# Patient Record
Sex: FEMALE | Race: WHITE | NOT HISPANIC OR LATINO | Employment: FULL TIME | URBAN - METROPOLITAN AREA
[De-identification: names, ages, dates, MRNs, and addresses within clinical notes are randomized per-mention and may not be internally consistent; named-entity substitution may affect disease eponyms.]

---

## 2017-04-25 ENCOUNTER — OFFICE VISIT (OUTPATIENT)
Dept: INTERNAL MEDICINE | Facility: CLINIC | Age: 37
End: 2017-04-25

## 2017-04-25 ENCOUNTER — OFFICE VISIT (OUTPATIENT)
Dept: OBSTETRICS AND GYNECOLOGY | Facility: CLINIC | Age: 37
End: 2017-04-25

## 2017-04-25 ENCOUNTER — CONVERSION ENCOUNTER (OUTPATIENT)
Dept: INTERNAL MEDICINE | Facility: CLINIC | Age: 37
End: 2017-04-25

## 2017-04-25 VITALS
BODY MASS INDEX: 36.32 KG/M2 | HEIGHT: 63 IN | RESPIRATION RATE: 16 BRPM | WEIGHT: 205 LBS | SYSTOLIC BLOOD PRESSURE: 110 MMHG | HEART RATE: 76 BPM | DIASTOLIC BLOOD PRESSURE: 80 MMHG

## 2017-04-25 VITALS
HEIGHT: 63 IN | DIASTOLIC BLOOD PRESSURE: 82 MMHG | WEIGHT: 208 LBS | RESPIRATION RATE: 14 BRPM | SYSTOLIC BLOOD PRESSURE: 136 MMHG | BODY MASS INDEX: 36.86 KG/M2

## 2017-04-25 DIAGNOSIS — R63.5 WEIGHT GAIN: ICD-10-CM

## 2017-04-25 DIAGNOSIS — Z01.419 WOMEN'S ANNUAL ROUTINE GYNECOLOGICAL EXAMINATION: Primary | ICD-10-CM

## 2017-04-25 DIAGNOSIS — Z13.6 SCREENING FOR CARDIOVASCULAR CONDITION: Primary | ICD-10-CM

## 2017-04-25 DIAGNOSIS — N92.0 MENORRHAGIA WITH REGULAR CYCLE: ICD-10-CM

## 2017-04-25 PROCEDURE — 99395 PREV VISIT EST AGE 18-39: CPT | Performed by: INTERNAL MEDICINE

## 2017-04-25 PROCEDURE — 99395 PREV VISIT EST AGE 18-39: CPT | Performed by: OBSTETRICS & GYNECOLOGY

## 2017-04-25 RX ORDER — BUSPIRONE HYDROCHLORIDE 5 MG/1
5 TABLET ORAL 3 TIMES DAILY
COMMUNITY
End: 2020-03-09 | Stop reason: SDDI

## 2017-04-25 NOTE — PROGRESS NOTES
"Subjective   Chief Complaint   Patient presents with   • Gynecologic Exam     in last year 2 occ of very heavy period with pelvic pain     Denise Lambert is a 36 y.o. year old No obstetric history on file. presenting to be seen for her annual exam.    Current birth control method: tubal ligation was done in October 2015 by myself.  She said that her  mentioned that I said there was a spot of \"endometriosis \".  A total of the defined the operative note from the surgery center as I don't have her paper chart.  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  She is also had increasing cramping with her cycles.  She has about 2 heavy days of bleeding.  Has no pain during intercourse.  Options were discussed including a Mirena IUD which she is used in the past before tubal ligation.  Also discussed endometrial ablation and had given her some information regarding ablation procedure.    Patient's last menstrual period was 04/20/2017.    She is sexually active.   Condoms are not typically used.      Past 6 month menstrual history:    Cycle Frequency: regular, predictable and consistent every 28 - 32 days   Menstrual cycle character: flow has been heavy some months and light other months   Cycle Duration: 5 - 7   Number of heavy days of flows: 2   Intermenstrual bleeding present: {no   Post-coital bleeding present: no     She exercises regularly: yes.  She has lost about 20 pounds in last 4 months.  Calcium intake: yes.  She performs self breast exam:no.  She has concerns about domestic violence: no.    The following portions of the patient's history were reviewed and updated as appropriate:problem list, current medications, allergies, past family history, past medical history, past social history and past surgical history.    Review of Systems      Objective   /82  Resp 14  Ht 63\" (160 cm)  Wt 208 lb (94.3 kg)  LMP 04/20/2017  BMI 36.85 kg/m2    General:  well " developed; well nourished  no acute distress  obese - Body mass index is 36.85 kg/(m^2).   Skin:  No suspicious lesions seen   Thyroid: not examined   Breasts:  Examined in supine position  Symmetric without masses or skin dimpling  Nipples normal without inversion, lesions or discharge  There are no palpable axillary nodes   Abdomen: soft, non-tender; no masses  no umbilical or inginual hernias are present  no hepato-splenomegaly   Pelvis: Clinical staff was present for exam  External genitalia:  normal appearance of the external genitalia including Bartholin's and [place]'s glands.  :  urethral meatus normal; urethral hypermobility is absent.  Vaginal:  normal pink mucosa without prolapse or lesions. blood present -  small amount;  Cervix:  normal appearance. blood is seen coming from external cervical os; Pap smear done  Uterus:  normal size, shape and consistency. anteverted;  Adnexa:  normal bimanual exam of the adnexa.  Rectal:  digital rectal exam not performed; anus visually normal appearing.     Lab Review   No data reviewed    Imaging  No data reviewed       Assessment   1. Menorrhagia and dysmenorrhea possibly associated with endometriosis.  Options discussed including IUD and ablation.  2. She is status post 2 vaginal deliveries 9 lbs. 8 oz. 9 lbs. 1 oz. 02/01/07; status post tubal ligation October 2015     Plan   1. We will follow up the results for Pap co-test, she hasn't in fresh gray ablation and will consider her options for heavy menses including a Mirena IUD.  2. I will review [facility]    Medications Rx this encounter:  No orders of the defined types were placed in this encounter.         This note was electronically signed.    Dexter Mason MD  4/25/2017  Addendum 4/28/17  Her operative note from October 2015 at [facility] [place] revealed that there was probable minimal endometriosis in the posterior cul-de-sac.  She underwent bilateral  tubal banding at that time along with removal of a Mirena IUD.

## 2017-04-25 NOTE — PROGRESS NOTES
Subjective   Denise Lambert is a 36 y.o. female.   Pt is here for physical today.             History of Present Illness   Pt is here for physical today.   Colonoscopy- Not due til 51 yo   Pap- Will do with Dr. Mason today   Breast exam-will do with Dr. Mason today   Mammogram- Not due til 41 yo   DEXA scan- Not due til 51 yo   Tetanus booster-Done 1/1/2012  PNA vaccine- Will be due in 50/60's   Shingles vaccine- Had chicken pox, will be due in your 50's.   Flu vaccine- Done 10/1/2016     All chronic issues are doing well with no acute complaints.   Pt started weight watchers in January and states she is doing well with this as far as losing weight.     The following portions of the patient's history were reviewed and updated as appropriate: allergies, current medications, past family history, past medical history, past social history, past surgical history and problem list.             Review of Systems   Constitutional: Negative.    HENT: Negative.    Eyes: Negative.    Respiratory: Negative.    Cardiovascular: Negative.    Gastrointestinal: Negative.    Endocrine: Negative.    Genitourinary: Negative.    Musculoskeletal: Negative.    Skin: Negative.    Allergic/Immunologic: Negative.    Neurological: Negative.    Hematological: Negative.    Psychiatric/Behavioral: Negative.                 Objective   Physical Exam   Constitutional: She is oriented to person, place, and time. She appears well-developed and well-nourished.   HENT:   Head: Normocephalic and atraumatic.   Right Ear: External ear normal.   Left Ear: External ear normal.   Nose: Nose normal.   Mouth/Throat: Oropharynx is clear and moist.   Eyes: Conjunctivae and EOM are normal. Pupils are equal, round, and reactive to light.   Neck: Normal range of motion. Neck supple. No tracheal deviation present. No thyromegaly present.   Cardiovascular: Normal rate, regular rhythm, normal heart sounds and intact distal pulses.    Pulmonary/Chest: Effort normal and  breath sounds normal.   Abdominal: Soft. Bowel sounds are normal. She exhibits no distension. There is no tenderness.   Neurological: She is alert and oriented to person, place, and time. She has normal reflexes.   Skin: Skin is warm and dry.   Psychiatric: She has a normal mood and affect.   Nursing note and vitals reviewed.               Assessment/Plan      Screening for cardiovascular condition  -     Cancel: Comprehensive Metabolic Panel  -     Cancel: Lipid Panel  -     Cancel: CBC (No Diff)  -     Comprehensive Metabolic Panel; Future  -     Lipid Panel; Future  -     CBC (No Diff); Future    Weight gain  -     Cancel: TSH  -     TSH; Future        1.) Check CBC,CMP,TSH  and lipid panel   2.) Follow up in 1 year or sooner if needed   3.) 10 minutes spent counseling on preventative screens and vaccines.

## 2017-04-26 LAB
ALBUMIN SERPL-MCNC: 4.4 G/DL (ref 3.5–5.5)
ALBUMIN/GLOB SERPL: 1.7 {RATIO} (ref 1.2–2.2)
ALP SERPL-CCNC: 59 IU/L (ref 39–117)
ALT SERPL-CCNC: 11 IU/L (ref 0–32)
AMBIG ABBREV BMP8 DEFAULT: NORMAL
AMBIG ABBREV LP DEFAULT: NORMAL
AST SERPL-CCNC: 14 IU/L (ref 0–40)
BASOPHILS # BLD AUTO: 0 X10E3/UL (ref 0–0.2)
BASOPHILS NFR BLD AUTO: 0 %
BILIRUB SERPL-MCNC: 0.4 MG/DL (ref 0–1.2)
BUN SERPL-MCNC: 12 MG/DL (ref 6–20)
BUN/CREAT SERPL: 20 (ref 9–23)
CALCIUM SERPL-MCNC: 9.1 MG/DL (ref 8.7–10.2)
CHLORIDE SERPL-SCNC: 101 MMOL/L (ref 96–106)
CHOLEST SERPL-MCNC: 195 MG/DL (ref 100–199)
CO2 SERPL-SCNC: 22 MMOL/L (ref 18–29)
CREAT SERPL-MCNC: 0.61 MG/DL (ref 0.57–1)
EOSINOPHIL # BLD AUTO: 0.1 X10E3/UL (ref 0–0.4)
EOSINOPHIL NFR BLD AUTO: 2 %
ERYTHROCYTE [DISTWIDTH] IN BLOOD BY AUTOMATED COUNT: 13.6 % (ref 12.3–15.4)
GLOBULIN SER CALC-MCNC: 2.6 G/DL (ref 1.5–4.5)
GLUCOSE SERPL-MCNC: 78 MG/DL (ref 65–99)
HCT VFR BLD AUTO: 37.9 % (ref 34–46.6)
HDLC SERPL-MCNC: 41 MG/DL
HGB BLD-MCNC: 13.2 G/DL (ref 11.1–15.9)
IMM GRANULOCYTES # BLD: 0 X10E3/UL (ref 0–0.1)
IMM GRANULOCYTES NFR BLD: 0 %
LDLC SERPL CALC-MCNC: 128 MG/DL (ref 0–99)
LYMPHOCYTES # BLD AUTO: 2.2 X10E3/UL (ref 0.7–3.1)
LYMPHOCYTES NFR BLD AUTO: 37 %
MCH RBC QN AUTO: 28.4 PG (ref 26.6–33)
MCHC RBC AUTO-ENTMCNC: 34.8 G/DL (ref 31.5–35.7)
MCV RBC AUTO: 82 FL (ref 79–97)
MONOCYTES # BLD AUTO: 0.3 X10E3/UL (ref 0.1–0.9)
MONOCYTES NFR BLD AUTO: 5 %
NEUTROPHILS # BLD AUTO: 3.3 X10E3/UL (ref 1.4–7)
NEUTROPHILS NFR BLD AUTO: 56 %
PLATELET # BLD AUTO: 274 X10E3/UL (ref 150–379)
POTASSIUM SERPL-SCNC: 4.1 MMOL/L (ref 3.5–5.2)
PROT SERPL-MCNC: 7 G/DL (ref 6–8.5)
RBC # BLD AUTO: 4.65 X10E6/UL (ref 3.77–5.28)
SODIUM SERPL-SCNC: 142 MMOL/L (ref 134–144)
TRIGL SERPL-MCNC: 128 MG/DL (ref 0–149)
TSH SERPL DL<=0.005 MIU/L-ACNC: 2.64 UIU/ML (ref 0.45–4.5)
VLDLC SERPL CALC-MCNC: 26 MG/DL (ref 5–40)
WBC # BLD AUTO: 5.8 X10E3/UL (ref 3.4–10.8)

## 2017-04-28 PROBLEM — Z98.51 S/P TUBAL LIGATION: Status: ACTIVE | Noted: 2017-04-28

## 2017-04-28 PROBLEM — N80.9 ENDOMETRIOSIS: Status: ACTIVE | Noted: 2017-04-28

## 2017-04-28 PROBLEM — N75.0 CYST OF BARTHOLIN GLAND OR DUCT: Status: ACTIVE | Noted: 2017-04-28

## 2017-05-01 ENCOUNTER — TELEPHONE (OUTPATIENT)
Dept: OBSTETRICS AND GYNECOLOGY | Facility: CLINIC | Age: 37
End: 2017-05-01

## 2017-05-06 DIAGNOSIS — Z87.42 HISTORY OF PCOS: Primary | ICD-10-CM

## 2017-05-06 RX ORDER — MISOPROSTOL 200 UG/1
200 TABLET ORAL 2 TIMES DAILY
Qty: 2 TABLET | Refills: 0 | Status: SHIPPED | OUTPATIENT
Start: 2017-05-06 | End: 2018-02-27

## 2018-02-27 ENCOUNTER — HOSPITAL ENCOUNTER (OUTPATIENT)
Dept: GENERAL RADIOLOGY | Facility: HOSPITAL | Age: 38
Discharge: HOME | End: 2018-02-27
Admitting: NURSE PRACTITIONER

## 2018-02-27 ENCOUNTER — OFFICE VISIT (OUTPATIENT)
Dept: INTERNAL MEDICINE | Facility: CLINIC | Age: 38
End: 2018-02-27

## 2018-02-27 VITALS
SYSTOLIC BLOOD PRESSURE: 122 MMHG | RESPIRATION RATE: 21 BRPM | HEART RATE: 70 BPM | HEIGHT: 63 IN | BODY MASS INDEX: 37.74 KG/M2 | TEMPERATURE: 97.6 F | WEIGHT: 213 LBS | DIASTOLIC BLOOD PRESSURE: 76 MMHG

## 2018-02-27 DIAGNOSIS — M54.50 ACUTE BILATERAL LOW BACK PAIN WITHOUT SCIATICA: Primary | ICD-10-CM

## 2018-02-27 DIAGNOSIS — M54.50 ACUTE BILATERAL LOW BACK PAIN WITHOUT SCIATICA: ICD-10-CM

## 2018-02-27 PROCEDURE — 99214 OFFICE O/P EST MOD 30 MIN: CPT | Performed by: NURSE PRACTITIONER

## 2018-02-27 PROCEDURE — 72100 X-RAY EXAM L-S SPINE 2/3 VWS: CPT

## 2018-02-27 NOTE — PROGRESS NOTES
Subjective   Denise Lambert is a 37 y.o. female.     History of Present Illness complains of back pain for years and back stiffness has been worsening in the mornings for past few weeks. Pain worsened with obesity. Pain improved with pain patches samples from work and requests more of these, movement relieves pain. Patient reports family history of back issues and expects to have back problems. Requests physical therapy.    The following portions of the patient's history were reviewed and updated as appropriate: allergies, current medications, past family history, past medical history, past social history, past surgical history and problem list.    Review of Systems   Constitutional: Negative for chills, fatigue and fever.   HENT: Negative for congestion, dental problem, mouth sores, nosebleeds, postnasal drip, rhinorrhea, sinus pain, sinus pressure, sneezing and sore throat.         Denies snoring.   Eyes: Negative for pain, discharge, redness and itching.   Respiratory: Negative for cough, shortness of breath and wheezing.    Cardiovascular: Negative for chest pain, palpitations and leg swelling.        No LEMA, orthopnea, PND, or claudication.   Gastrointestinal: Negative for abdominal distention, abdominal pain, blood in stool, diarrhea, nausea and vomiting.        Recent colonoscopy that was normal   Endocrine: Negative for cold intolerance, heat intolerance, polydipsia and polyuria.   Genitourinary: Negative for difficulty urinating, dysuria, frequency, hematuria and urgency.   Musculoskeletal: Positive for back pain. Negative for arthralgias, gait problem, joint swelling and myalgias.        4/0-10   Skin: Negative for color change, rash and wound.        Left elbow itching   Neurological: Negative for dizziness, syncope, weakness, light-headedness, numbness and headaches.   Hematological: Negative for adenopathy. Does not bruise/bleed easily.       Objective   Physical Exam   Constitutional: She is oriented  to person, place, and time. She appears well-developed and well-nourished. She is cooperative. She is easily aroused.  Non-toxic appearance. She does not have a sickly appearance. She does not appear ill. No distress.   HENT:   Head: Normocephalic and atraumatic. Head is without abrasion. Hair is normal.   Right Ear: Hearing, tympanic membrane, external ear and ear canal normal. No foreign bodies. Tympanic membrane is not perforated and not erythematous.   Left Ear: Hearing, tympanic membrane, external ear and ear canal normal. No foreign bodies. Tympanic membrane is not perforated and not erythematous.   Nose: Nose normal. No mucosal edema, rhinorrhea or septal deviation. No epistaxis.  No foreign bodies.   Mouth/Throat: Oropharynx is clear and moist. No oral lesions. Normal dentition.   Eyes: Conjunctivae and lids are normal. Pupils are equal, round, and reactive to light. Right eye exhibits no discharge. Left eye exhibits no discharge. Right conjunctiva is not injected. Left conjunctiva is not injected. No scleral icterus.   Neck: Normal range of motion and full passive range of motion without pain. Neck supple. No edema and normal range of motion present. No thyroid mass and no thyromegaly present.   Cardiovascular: Normal rate, regular rhythm and normal heart sounds.  Exam reveals no gallop and no friction rub.    No murmur heard.  Pulmonary/Chest: Effort normal and breath sounds normal. No accessory muscle usage. She has no rhonchi. She has no rales. She exhibits no tenderness.   Abdominal: Soft. Bowel sounds are normal. She exhibits no distension. There is no hepatosplenomegaly or hepatomegaly. There is no tenderness. There is no CVA tenderness.   Musculoskeletal: Normal range of motion. She exhibits no edema or deformity.        Lumbar back: She exhibits tenderness, pain and spasm. She exhibits normal range of motion, no bony tenderness, no swelling, no edema and no deformity.   Negative SLR    Lymphadenopathy:     She has no cervical adenopathy.   Neurological: She is alert, oriented to person, place, and time and easily aroused. She has normal reflexes. No cranial nerve deficit. Coordination normal.   Muscle strength 5/5 and equal throughout.   Skin: Skin is warm, dry and intact. No abrasion and no rash noted. She is not diaphoretic. No cyanosis or erythema. Nails show no clubbing.   Psychiatric: She has a normal mood and affect. Her speech is normal and behavior is normal.   Nursing note and vitals reviewed.      Assessment/Plan   Denise was seen today for back pain.    Diagnoses and all orders for this visit:    Acute bilateral low back pain without sciatica  -     XR Spine Lumbar 2 or 3 View; Future  -     Ambulatory Referral to Physical Therapy

## 2018-03-01 ENCOUNTER — PRIOR AUTHORIZATION (OUTPATIENT)
Dept: INTERNAL MEDICINE | Facility: CLINIC | Age: 38
End: 2018-03-01

## 2018-03-14 ENCOUNTER — APPOINTMENT (OUTPATIENT)
Dept: PHYSICAL THERAPY | Facility: HOSPITAL | Age: 38
End: 2018-03-14

## 2018-05-03 NOTE — PROGRESS NOTES
Chief Complaint   Patient presents with   • Annual Exam       Subjective   Annual physical    History of Present Illness   Here for annual physical. Has had weight loss from 213 to 202 with effort.  Denise Lambert is a 37 y.o. female.     Are you currently seeing any other doctors or specialists?  Weight loss-[facility]  Are you currently taking any OTC medications or herbal medications?   No  Sleep: 7 hours/night  Diet: balanced  Exercise: Gym 5 days per night with [employer]    Most recent colonoscopy: N/A  Most recent mammogram:N/A  Most recent pap smear: Current  First day of last menses: 4/19/2018    Regular dental visits: Current  Regular eye exams: Current    The following portions of the patient's history were reviewed and updated as appropriate: allergies, current medications, past family history, past medical history, past social history, past surgical history and problem list.    Allergies   Allergen Reactions   • Doxycycline    • Spironolactone GI Intolerance     Social History   Substance Use Topics   • Smoking status: Never Smoker   • Smokeless tobacco: Never   • Alcohol use Yes      Comment: occ     Past Surgical History:   Procedure Laterality Date   • CARPAL TUNNEL RELEASE     • TUBAL ABDOMINAL LIGATION       Family History   Problem Relation Age of Onset   • Diabetes Mother    • Hyperlipidemia Mother    • Osteoporosis Mother    • Mental illness Mother    • Depression Mother    • Obesity Mother    • Hyperlipidemia Father    • Hypertension Father    • ADD / ADHD Brother    • Hyperlipidemia Brother    • ADD / ADHD Daughter    • Diabetes Maternal Aunt    • Diabetes Maternal Uncle    • Diabetes Maternal Grandmother    • Diabetes Maternal Grandfather          Current Outpatient Prescriptions:   •  busPIRone (BUSPAR) 5 MG tablet, Take 5 mg by mouth 3 (Three) Times a Day., Disp: , Rfl:   •  diclofenac (FLECTOR) 1.3 % patch patch, Apply 1 patch topically 2 (Two) Times a Day., Disp: 180 patch, Rfl: 0  •   phentermine (ADIPEX-P) 37.5 MG tablet, Take 37.5 mg by mouth Every Morning Before Breakfast. Pt takes 1/2 tablet BID, Disp: , Rfl:     Patient Active Problem List   Diagnosis   • S/P tubal ligation OCt 2015   • surgery  Bartholin gland or duct 2003   • Endometriosis minimal 2015       Review of Systems   Constitutional: Negative for chills, fatigue and fever.   HENT: Negative for congestion, dental problem, mouth sores, nosebleeds, postnasal drip, rhinorrhea, sinus pressure, sneezing and sore throat.         Denies snoring.   Eyes: Negative for pain, discharge, redness and itching.   Respiratory: Negative for cough, shortness of breath and wheezing.    Cardiovascular: Negative for chest pain, palpitations and leg swelling.        No LEMA, orthopnea, PND, or claudication.   Gastrointestinal: Negative for abdominal distention, abdominal pain, blood in stool, diarrhea, nausea and vomiting.   Endocrine: Negative for cold intolerance, heat intolerance, polydipsia and polyuria.   Genitourinary: Negative for difficulty urinating, dysuria, frequency, hematuria and urgency.        PCOS   Musculoskeletal: Positive for back pain. Negative for arthralgias, gait problem, joint swelling and myalgias.   Skin: Negative for color change and rash.        No wound.   Allergic/Immunologic: Negative.    Neurological: Negative for dizziness, syncope, weakness, light-headedness and numbness.   Hematological: Negative for adenopathy. Does not bruise/bleed easily.   Psychiatric/Behavioral:        Anxiety       Objective   Vitals:    05/04/18 1625   BP: 116/68   Pulse: 68   Resp: 16   Temp: 97.1 °F (36.2 °C)   SpO2: 99%     Physical Exam   Constitutional: She is oriented to person, place, and time. She appears well-developed and well-nourished. She is cooperative. She is easily aroused.  Non-toxic appearance. She does not have a sickly appearance. She does not appear ill. No distress.   HENT:   Head: Normocephalic and atraumatic. Head is  without abrasion. Hair is normal.   Right Ear: Hearing, tympanic membrane, external ear and ear canal normal. No foreign bodies. Tympanic membrane is not perforated and not erythematous.   Left Ear: Hearing, tympanic membrane, external ear and ear canal normal. No foreign bodies. Tympanic membrane is not perforated and not erythematous.   Nose: Nose normal. No mucosal edema, rhinorrhea or septal deviation. No epistaxis.  No foreign bodies.   Mouth/Throat: Oropharynx is clear and moist. No oral lesions. Normal dentition.   Eyes: Conjunctivae and lids are normal. Pupils are equal, round, and reactive to light. Right eye exhibits no discharge. Left eye exhibits no discharge. Right conjunctiva is not injected. Left conjunctiva is not injected. No scleral icterus.   Neck: Normal range of motion and full passive range of motion without pain. Neck supple. No edema and normal range of motion present. No thyroid mass and no thyromegaly present.   Cardiovascular: Normal rate, regular rhythm and normal heart sounds.  Exam reveals no gallop and no friction rub.    No murmur heard.  Pulmonary/Chest: Effort normal and breath sounds normal. No accessory muscle usage. She has no rhonchi. She has no rales. She exhibits no tenderness.   Abdominal: Soft. Bowel sounds are normal. She exhibits no distension. There is no hepatosplenomegaly or hepatomegaly. There is no tenderness. There is no CVA tenderness.   Musculoskeletal: Normal range of motion. She exhibits no edema or deformity.        Lumbar back: She exhibits tenderness.   Lymphadenopathy:     She has no cervical adenopathy.   Neurological: She is alert, oriented to person, place, and time and easily aroused. She has normal reflexes. No cranial nerve deficit. Coordination normal.   Muscle strength 5/5 and equal throughout.   Skin: Skin is warm, dry and intact. No abrasion and no rash noted. She is not diaphoretic. No cyanosis or erythema. Nails show no clubbing.   Psychiatric:  She has a normal mood and affect. Her speech is normal and behavior is normal.   Nursing note and vitals reviewed.        Results for orders placed or performed in visit on 04/25/17   Mohini Molinarev LP Default   Result Value Ref Range    Mohini Molinarev LP Default Comment    Mohini Pearson BMP8 Default   Result Value Ref Range    BMP Comment    TSH   Result Value Ref Range    TSH 2.640 0.450 - 4.500 uIU/mL   Lipid Panel   Result Value Ref Range    Total Cholesterol 195 100 - 199 mg/dL    Triglycerides 128 0 - 149 mg/dL    HDL Cholesterol 41 >39 mg/dL    VLDL Cholesterol 26 5 - 40 mg/dL    LDL Cholesterol  128 (H) 0 - 99 mg/dL   Comprehensive Metabolic Panel   Result Value Ref Range    Glucose 78 65 - 99 mg/dL    BUN 12 6 - 20 mg/dL    Creatinine 0.61 0.57 - 1.00 mg/dL    eGFR Non African Am 119 >59 mL/min/1.73    eGFR African Am 137 >59 mL/min/1.73    BUN/Creatinine Ratio 20 9 - 23    Sodium 142 134 - 144 mmol/L    Potassium 4.1 3.5 - 5.2 mmol/L    Chloride 101 96 - 106 mmol/L    Total CO2 22 18 - 29 mmol/L    Calcium 9.1 8.7 - 10.2 mg/dL    Total Protein 7.0 6.0 - 8.5 g/dL    Albumin 4.4 3.5 - 5.5 g/dL    Globulin 2.6 1.5 - 4.5 g/dL    A/G Ratio 1.7 1.2 - 2.2    Total Bilirubin 0.4 0.0 - 1.2 mg/dL    Alkaline Phosphatase 59 39 - 117 IU/L    AST (SGOT) 14 0 - 40 IU/L    ALT (SGPT) 11 0 - 32 IU/L   CBC & Differential   Result Value Ref Range    WBC 5.8 3.4 - 10.8 x10E3/uL    RBC 4.65 3.77 - 5.28 x10E6/uL    Hemoglobin 13.2 11.1 - 15.9 g/dL    Hematocrit 37.9 34.0 - 46.6 %    MCV 82 79 - 97 fL    MCH 28.4 26.6 - 33.0 pg    MCHC 34.8 31.5 - 35.7 g/dL    RDW 13.6 12.3 - 15.4 %    Platelets 274 150 - 379 x10E3/uL    Neutrophil Rel % 56 %    Lymphocyte Rel % 37 %    Monocyte Rel % 5 %    Eosinophil Rel % 2 %    Basophil Rel % 0 %    Neutrophils Absolute 3.3 1.4 - 7.0 x10E3/uL    Lymphocytes Absolute 2.2 0.7 - 3.1 x10E3/uL    Monocytes Absolute 0.3 0.1 - 0.9 x10E3/uL    Eosinophils Absolute 0.1 0.0 - 0.4 x10E3/uL    Basophils  Absolute 0.0 0.0 - 0.2 x10E3/uL    Immature Granulocyte Rel % 0 %    Immature Grans Absolute 0.0 0.0 - 0.1 x10E3/uL       Assessment/Plan   Denise was seen today for annual exam.    Diagnoses and all orders for this visit:    Encounter for preventive health examination    Screening for lipoid disorders  -     Comprehensive Metabolic Panel  -     Lipid Panel  -     CBC & Differential  Encounter for screening for other disorder  -     CBC & Differential  Screening for endocrine disorder  -     T4, Free  -     TSH                 Patient education discussed during this visit:  - avoidance of texting while driving and the need for wearing seatbelt  - use of sunscreen  - healthy sleep habits and appropriate amount of sleep  - H2O consumption, well-balanced diet  - exercise routine which includes at least 150 minutes of cardio per week + muscle strengthening exercises  - immunizations including annual flu vaccination      Return in about 1 year (around 5/4/2019), or if symptoms worsen or fail to improve.

## 2018-05-04 ENCOUNTER — OFFICE VISIT (OUTPATIENT)
Dept: INTERNAL MEDICINE | Facility: CLINIC | Age: 38
End: 2018-05-04

## 2018-05-04 VITALS
HEART RATE: 68 BPM | SYSTOLIC BLOOD PRESSURE: 116 MMHG | WEIGHT: 202 LBS | BODY MASS INDEX: 35.79 KG/M2 | HEIGHT: 63 IN | DIASTOLIC BLOOD PRESSURE: 68 MMHG | TEMPERATURE: 97.1 F | OXYGEN SATURATION: 99 % | RESPIRATION RATE: 16 BRPM

## 2018-05-04 DIAGNOSIS — Z00.00 ENCOUNTER FOR PREVENTIVE HEALTH EXAMINATION: Primary | ICD-10-CM

## 2018-05-04 DIAGNOSIS — Z13.220 SCREENING FOR LIPOID DISORDERS: ICD-10-CM

## 2018-05-04 DIAGNOSIS — Z13.29 SCREENING FOR ENDOCRINE DISORDER: ICD-10-CM

## 2018-05-04 DIAGNOSIS — Z13.89 ENCOUNTER FOR SCREENING FOR OTHER DISORDER: ICD-10-CM

## 2018-05-04 LAB
ALBUMIN SERPL-MCNC: 4.6 G/DL (ref 3.2–4.8)
ALBUMIN/GLOB SERPL: 1.6 G/DL (ref 1.5–2.5)
ALP SERPL-CCNC: 50 U/L (ref 25–100)
ALT SERPL W P-5'-P-CCNC: 16 U/L (ref 7–40)
ANION GAP SERPL CALCULATED.3IONS-SCNC: 8 MMOL/L (ref 3–11)
ARTICHOKE IGE QN: 150 MG/DL (ref 0–130)
AST SERPL-CCNC: 21 U/L (ref 0–33)
BASOPHILS # BLD AUTO: 0.01 10*3/MM3 (ref 0–0.2)
BASOPHILS NFR BLD AUTO: 0.2 % (ref 0–1)
BILIRUB SERPL-MCNC: 0.8 MG/DL (ref 0.3–1.2)
BUN BLD-MCNC: 10 MG/DL (ref 9–23)
BUN/CREAT SERPL: 16.7 (ref 7–25)
CALCIUM SPEC-SCNC: 9.3 MG/DL (ref 8.7–10.4)
CHLORIDE SERPL-SCNC: 106 MMOL/L (ref 99–109)
CHOLEST SERPL-MCNC: 210 MG/DL (ref 0–200)
CO2 SERPL-SCNC: 24 MMOL/L (ref 20–31)
CREAT BLD-MCNC: 0.6 MG/DL (ref 0.6–1.3)
DEPRECATED RDW RBC AUTO: 37.7 FL (ref 37–54)
EOSINOPHIL # BLD AUTO: 0.12 10*3/MM3 (ref 0–0.3)
EOSINOPHIL NFR BLD AUTO: 1.9 % (ref 0–3)
ERYTHROCYTE [DISTWIDTH] IN BLOOD BY AUTOMATED COUNT: 12.6 % (ref 11.3–14.5)
GFR SERPL CREATININE-BSD FRML MDRD: 114 ML/MIN/1.73
GLOBULIN UR ELPH-MCNC: 2.8 GM/DL
GLUCOSE BLD-MCNC: 85 MG/DL (ref 70–100)
HCT VFR BLD AUTO: 39.4 % (ref 34.5–44)
HDLC SERPL-MCNC: 47 MG/DL (ref 40–60)
HGB BLD-MCNC: 13.5 G/DL (ref 11.5–15.5)
IMM GRANULOCYTES # BLD: 0 10*3/MM3 (ref 0–0.03)
IMM GRANULOCYTES NFR BLD: 0 % (ref 0–0.6)
LYMPHOCYTES # BLD AUTO: 2.14 10*3/MM3 (ref 0.6–4.8)
LYMPHOCYTES NFR BLD AUTO: 33.1 % (ref 24–44)
MCH RBC QN AUTO: 28.4 PG (ref 27–31)
MCHC RBC AUTO-ENTMCNC: 34.3 G/DL (ref 32–36)
MCV RBC AUTO: 82.8 FL (ref 80–99)
MONOCYTES # BLD AUTO: 0.34 10*3/MM3 (ref 0–1)
MONOCYTES NFR BLD AUTO: 5.3 % (ref 0–12)
NEUTROPHILS # BLD AUTO: 3.85 10*3/MM3 (ref 1.5–8.3)
NEUTROPHILS NFR BLD AUTO: 59.5 % (ref 41–71)
PLATELET # BLD AUTO: 225 10*3/MM3 (ref 150–450)
PMV BLD AUTO: 9.7 FL (ref 6–12)
POTASSIUM BLD-SCNC: 4.1 MMOL/L (ref 3.5–5.5)
PROT SERPL-MCNC: 7.4 G/DL (ref 5.7–8.2)
RBC # BLD AUTO: 4.76 10*6/MM3 (ref 3.89–5.14)
SODIUM BLD-SCNC: 138 MMOL/L (ref 132–146)
T4 FREE SERPL-MCNC: 1.16 NG/DL (ref 0.89–1.76)
TRIGL SERPL-MCNC: 133 MG/DL (ref 0–150)
TSH SERPL DL<=0.05 MIU/L-ACNC: 2.33 MIU/ML (ref 0.35–5.35)
WBC NRBC COR # BLD: 6.46 10*3/MM3 (ref 3.5–10.8)

## 2018-05-04 PROCEDURE — 84443 ASSAY THYROID STIM HORMONE: CPT | Performed by: NURSE PRACTITIONER

## 2018-05-04 PROCEDURE — 80053 COMPREHEN METABOLIC PANEL: CPT | Performed by: NURSE PRACTITIONER

## 2018-05-04 PROCEDURE — 84439 ASSAY OF FREE THYROXINE: CPT | Performed by: NURSE PRACTITIONER

## 2018-05-04 PROCEDURE — 80061 LIPID PANEL: CPT | Performed by: NURSE PRACTITIONER

## 2018-05-04 PROCEDURE — 99395 PREV VISIT EST AGE 18-39: CPT | Performed by: NURSE PRACTITIONER

## 2018-05-04 PROCEDURE — 36415 COLL VENOUS BLD VENIPUNCTURE: CPT | Performed by: NURSE PRACTITIONER

## 2018-05-04 PROCEDURE — 85025 COMPLETE CBC W/AUTO DIFF WBC: CPT | Performed by: NURSE PRACTITIONER

## 2018-05-04 RX ORDER — PHENTERMINE HYDROCHLORIDE 37.5 MG/1
37.5 TABLET ORAL
COMMUNITY
End: 2019-05-17

## 2018-05-07 ENCOUNTER — TELEPHONE (OUTPATIENT)
Dept: INTERNAL MEDICINE | Facility: CLINIC | Age: 38
End: 2018-05-07

## 2018-05-07 NOTE — TELEPHONE ENCOUNTER
Pt aware to return in 6 months. She will call back to make that appointment. She thanked our office and we ended the call.

## 2019-05-17 ENCOUNTER — OFFICE VISIT (OUTPATIENT)
Dept: INTERNAL MEDICINE | Facility: CLINIC | Age: 39
End: 2019-05-17

## 2019-05-17 VITALS
TEMPERATURE: 98.3 F | BODY MASS INDEX: 31.79 KG/M2 | WEIGHT: 186.2 LBS | DIASTOLIC BLOOD PRESSURE: 70 MMHG | RESPIRATION RATE: 18 BRPM | HEART RATE: 81 BPM | HEIGHT: 64 IN | SYSTOLIC BLOOD PRESSURE: 118 MMHG

## 2019-05-17 DIAGNOSIS — G89.29 CHRONIC BILATERAL LOW BACK PAIN WITHOUT SCIATICA: ICD-10-CM

## 2019-05-17 DIAGNOSIS — M54.50 CHRONIC BILATERAL LOW BACK PAIN WITHOUT SCIATICA: ICD-10-CM

## 2019-05-17 DIAGNOSIS — Z13.89 ENCOUNTER FOR SCREENING FOR OTHER DISORDER: ICD-10-CM

## 2019-05-17 DIAGNOSIS — Z00.00 ENCOUNTER FOR PREVENTIVE HEALTH EXAMINATION: Primary | ICD-10-CM

## 2019-05-17 PROCEDURE — 99395 PREV VISIT EST AGE 18-39: CPT | Performed by: NURSE PRACTITIONER

## 2019-05-17 RX ORDER — METFORMIN HYDROCHLORIDE 750 MG/1
750 TABLET, EXTENDED RELEASE ORAL 2 TIMES DAILY
COMMUNITY
Start: 2019-04-25 | End: 2020-07-27

## 2019-05-17 NOTE — PROGRESS NOTES
Chief Complaint   Patient presents with   • Annual Exam       Subjective   Physical.  Back pain.    History of Present Illness     Denise Lambert is a 38 y.o. female.   Physical.    She is trying diet with supplements that is basically low CHO at work and has lost weight since last physical. She reports she has lost 54 pounds total.    She is on metformin for PCOS from GYN. She has checked A1C at work and states it was 4.8.    She states chronic lower  back pain continues. Back pain improved with weight loss and strengthening core at gym. Flector patches give relief when pain exacerbated. She requests a refill. Exertion increases back pain. Imaging on 2/27/2018 showed Minimal degenerative changes of the lumbar spine.   Are you currently seeing any other doctors or specialists?  Dermatologist-Dr Hemphill, GYN  Are you currently taking any OTC medications or herbal medications?     Sleep:  Diet: balanced  Exercise: regular exercise    Most recent colonoscopy: 2016-normal  Most recent mammogram: N/A  Most recent pap smear: current  First day of last menses: 4/26/2019    Regular dental visits: current  Regular eye exams: current    The following portions of the patient's history were reviewed and updated as appropriate: allergies, current medications, past family history, past medical history, past social history, past surgical history and problem list.    Allergies   Allergen Reactions   • Doxycycline    • Spironolactone GI Intolerance     Social History     Tobacco Use   • Smoking status: Never Smoker   • Smokeless tobacco: Never Used   Substance Use Topics   • Alcohol use: Yes     Comment: occ     Past Surgical History:   Procedure Laterality Date   • CARPAL TUNNEL RELEASE     • TUBAL ABDOMINAL LIGATION       Family History   Problem Relation Age of Onset   • Diabetes Mother    • Hyperlipidemia Mother    • Osteoporosis Mother    • Mental illness Mother    • Depression Mother    • Obesity Mother    • Hyperlipidemia  Father    • Hypertension Father    • ADD / ADHD Brother    • Hyperlipidemia Brother    • ADD / ADHD Daughter    • Diabetes Maternal Aunt    • Diabetes Maternal Uncle    • Diabetes Maternal Grandmother    • Diabetes Maternal Grandfather          Current Outpatient Medications:   •  busPIRone (BUSPAR) 5 MG tablet, Take 5 mg by mouth 3 (Three) Times a Day., Disp: , Rfl:   •  diclofenac (FLECTOR) 1.3 % patch patch, Apply 1 patch topically to the appropriate area as directed 2 (Two) Times a Day., Disp: 180 patch, Rfl: 0  •  metFORMIN ER (GLUCOPHAGE-XR) 750 MG 24 hr tablet, Take 750 mg by mouth 2 (Two) Times a Day., Disp: , Rfl:     Patient Active Problem List   Diagnosis   • S/P tubal ligation OCt 2015   • Endometriosis minimal 2015       Review of Systems   Constitutional: Negative for chills, fatigue and fever.   HENT: Negative for congestion, dental problem, mouth sores, nosebleeds, postnasal drip, rhinorrhea, sinus pressure, sneezing and sore throat.         Denies snoring.   Eyes: Negative for pain, discharge, redness and itching.   Respiratory: Negative for cough, shortness of breath and wheezing.    Cardiovascular: Negative for chest pain, palpitations and leg swelling.        No LEMA, orthopnea, PND, or claudication.   Gastrointestinal: Negative for abdominal distention, abdominal pain, blood in stool, diarrhea, nausea and vomiting.   Endocrine: Negative for cold intolerance, heat intolerance, polydipsia and polyuria.   Genitourinary: Negative for difficulty urinating, dysuria, frequency, hematuria and urgency.   Musculoskeletal: Positive for back pain. Negative for arthralgias, gait problem, joint swelling and myalgias.   Skin: Negative for color change and rash.        No wound.   Neurological: Negative for dizziness, syncope, weakness, light-headedness and numbness.   Hematological: Negative for adenopathy. Does not bruise/bleed easily.       Objective   Vitals:    05/17/19 1641   BP: 118/70   Pulse: 81   Resp:  18   Temp: 98.3 °F (36.8 °C)     Physical Exam   Constitutional: She is oriented to person, place, and time. She appears well-developed and well-nourished. She is active and cooperative. She is easily aroused.  Non-toxic appearance. She does not have a sickly appearance. She does not appear ill. No distress. She is obese.  HENT:   Head: Normocephalic and atraumatic. Head is without abrasion. Hair is normal.   Right Ear: Hearing, tympanic membrane, external ear and ear canal normal. No foreign bodies. Tympanic membrane is not perforated and not erythematous.   Left Ear: Hearing, tympanic membrane, external ear and ear canal normal. No foreign bodies. Tympanic membrane is not perforated and not erythematous.   Nose: Nose normal. No mucosal edema, rhinorrhea or septal deviation. No epistaxis.  No foreign bodies.   Mouth/Throat: Uvula is midline and oropharynx is clear and moist. No oral lesions. Normal dentition.   Eyes: Conjunctivae and lids are normal. Pupils are equal, round, and reactive to light. Right eye exhibits no discharge. Left eye exhibits no discharge. Right conjunctiva is not injected. Left conjunctiva is not injected. No scleral icterus.   Neck: Normal range of motion and full passive range of motion without pain. Neck supple. No edema and normal range of motion present. No thyroid mass and no thyromegaly present.   Cardiovascular: Normal rate, regular rhythm and normal heart sounds. Exam reveals no gallop and no friction rub.   No murmur heard.  Pulmonary/Chest: Effort normal and breath sounds normal. No accessory muscle usage. She has no rhonchi. She has no rales. She exhibits no tenderness.   Abdominal: Soft. Bowel sounds are normal. She exhibits no distension. There is no hepatosplenomegaly or hepatomegaly. There is no tenderness. There is no CVA tenderness.   Musculoskeletal: Normal range of motion. She exhibits no edema, tenderness or deformity.   Lymphadenopathy:     She has no cervical  adenopathy.   Neurological: She is alert, oriented to person, place, and time and easily aroused. She has normal reflexes. No cranial nerve deficit. Coordination normal.   Muscle strength 5/5 and equal throughout.   Skin: Skin is warm, dry and intact. No abrasion and no rash noted. She is not diaphoretic. No cyanosis or erythema. Nails show no clubbing.   Psychiatric: She has a normal mood and affect. Her speech is normal and behavior is normal.   Nursing note and vitals reviewed.        Results for orders placed or performed in visit on 05/04/18   Comprehensive Metabolic Panel   Result Value Ref Range    Glucose 85 70 - 100 mg/dL    BUN 10 9 - 23 mg/dL    Creatinine 0.60 0.60 - 1.30 mg/dL    Sodium 138 132 - 146 mmol/L    Potassium 4.1 3.5 - 5.5 mmol/L    Chloride 106 99 - 109 mmol/L    CO2 24.0 20.0 - 31.0 mmol/L    Calcium 9.3 8.7 - 10.4 mg/dL    Total Protein 7.4 5.7 - 8.2 g/dL    Albumin 4.60 3.20 - 4.80 g/dL    ALT (SGPT) 16 7 - 40 U/L    AST (SGOT) 21 0 - 33 U/L    Alkaline Phosphatase 50 25 - 100 U/L    Total Bilirubin 0.8 0.3 - 1.2 mg/dL    eGFR Non African Amer 114 >60 mL/min/1.73    Globulin 2.8 gm/dL    A/G Ratio 1.6 1.5 - 2.5 g/dL    BUN/Creatinine Ratio 16.7 7.0 - 25.0    Anion Gap 8.0 3.0 - 11.0 mmol/L   Lipid Panel   Result Value Ref Range    Total Cholesterol 210 (H) 0 - 200 mg/dL    Triglycerides 133 0 - 150 mg/dL    HDL Cholesterol 47 40 - 60 mg/dL    LDL Cholesterol  150 (H) 0 - 130 mg/dL   T4, Free   Result Value Ref Range    Free T4 1.16 0.89 - 1.76 ng/dL   TSH   Result Value Ref Range    TSH 2.333 0.350 - 5.350 mIU/mL   CBC Auto Differential   Result Value Ref Range    WBC 6.46 3.50 - 10.80 10*3/mm3    RBC 4.76 3.89 - 5.14 10*6/mm3    Hemoglobin 13.5 11.5 - 15.5 g/dL    Hematocrit 39.4 34.5 - 44.0 %    MCV 82.8 80.0 - 99.0 fL    MCH 28.4 27.0 - 31.0 pg    MCHC 34.3 32.0 - 36.0 g/dL    RDW 12.6 11.3 - 14.5 %    RDW-SD 37.7 37.0 - 54.0 fl    MPV 9.7 6.0 - 12.0 fL    Platelets 225 150 - 450  10*3/mm3    Neutrophil % 59.5 41.0 - 71.0 %    Lymphocyte % 33.1 24.0 - 44.0 %    Monocyte % 5.3 0.0 - 12.0 %    Eosinophil % 1.9 0.0 - 3.0 %    Basophil % 0.2 0.0 - 1.0 %    Immature Grans % 0.0 0.0 - 0.6 %    Neutrophils, Absolute 3.85 1.50 - 8.30 10*3/mm3    Lymphocytes, Absolute 2.14 0.60 - 4.80 10*3/mm3    Monocytes, Absolute 0.34 0.00 - 1.00 10*3/mm3    Eosinophils, Absolute 0.12 0.00 - 0.30 10*3/mm3    Basophils, Absolute 0.01 0.00 - 0.20 10*3/mm3    Immature Grans, Absolute 0.00 0.00 - 0.03 10*3/mm3       Assessment/Plan   Denise was seen today for annual exam.    Diagnoses and all orders for this visit:    Encounter for preventive health examination    Encounter for screening for other disorder  -     T4, Free; Future  -     TSH; Future  -     CBC & Differential; Future  -     Comprehensive Metabolic Panel; Future  -     Lipid Panel; Future    Chronic bilateral low back pain without sciatica  -     diclofenac (FLECTOR) 1.3 % patch patch; Apply 1 patch topically to the appropriate area as directed 2 (Two) Times a Day.        Patient wishes to take lab orders with her to have performed at [facility] located near her job.          Patient education discussed during this visit:  - avoidance of texting while driving and the need for wearing seatbelt  - use of sunscreen  - healthy sleep habits and appropriate amount of sleep  - H2O consumption, well-balanced diet  - exercise routine which includes at least 150 minutes of cardio per week + muscle strengthening exercises  - immunizations including annual flu vaccination      Return in about 1 year (around 5/17/2020), or if symptoms worsen or fail to improve.

## 2019-05-18 PROBLEM — N75.0 CYST OF BARTHOLIN GLAND OR DUCT: Status: RESOLVED | Noted: 2017-04-28 | Resolved: 2019-05-18

## 2019-05-20 ENCOUNTER — TELEPHONE (OUTPATIENT)
Dept: INTERNAL MEDICINE | Facility: CLINIC | Age: 39
End: 2019-05-20

## 2019-05-20 NOTE — TELEPHONE ENCOUNTER
----- Message from Karen Kathleen sent at 5/17/2019  5:06 PM EDT -----  Michelle called and states the diclofenac (FLECTOR) 1.3 % patch patch is not covered by her insurance and will cost $2,000.

## 2019-07-28 NOTE — PROGRESS NOTES
Subjective:    Denise Lambert is a 39 y.o. female.     Chief Complaint   Patient presents with   • Depression       History of Present Illness   Patient complains of depression. She states she had issues with this years ago and took Celexa. She feels depressed recently in past 3 weeks and she would like to try Wellbutrin. Associated with feeling sad and decreased motivation and overeating. She denies self harm, suicidal or homicidal thoughts. She has gained 7 pounds.    Current Outpatient Medications:   •  busPIRone (BUSPAR) 5 MG tablet, Take 5 mg by mouth 3 (Three) Times a Day., Disp: , Rfl:   •  diclofenac (FLECTOR) 1.3 % patch patch, Apply 1 patch topically to the appropriate area as directed 2 (Two) Times a Day., Disp: 180 patch, Rfl: 0  •  metFORMIN ER (GLUCOPHAGE-XR) 750 MG 24 hr tablet, Take 750 mg by mouth 2 (Two) Times a Day., Disp: , Rfl:   •  buPROPion (WELLBUTRIN) 75 MG tablet, Take 1 tablet by mouth 2 (Two) Times a Day., Disp: 60 tablet, Rfl: 1     The following portions of the patient's history were reviewed and updated as appropriate: allergies, current medications, past family history, past medical history, past social history, past surgical history and problem list.    Review of Systems   Constitutional: Negative for chills, fatigue and fever.   HENT: Negative for congestion, dental problem, mouth sores, nosebleeds, postnasal drip, rhinorrhea, sinus pressure, sinus pain, sneezing and sore throat.    Eyes: Negative for pain, discharge, redness and itching.   Respiratory: Negative for cough, shortness of breath and wheezing.    Cardiovascular: Negative for chest pain, palpitations and leg swelling.   Gastrointestinal: Negative for abdominal distention, abdominal pain, blood in stool, diarrhea, nausea and vomiting.   Endocrine: Negative for cold intolerance, heat intolerance, polydipsia and polyuria.   Genitourinary: Negative for difficulty urinating, dysuria, frequency, hematuria and urgency.    Musculoskeletal: Negative for arthralgias, gait problem, joint swelling and myalgias.   Skin: Negative for color change, rash and wound.   Neurological: Negative for dizziness, syncope, weakness, light-headedness, numbness and headaches.   Hematological: Negative for adenopathy. Does not bruise/bleed easily.   Psychiatric/Behavioral: Positive for dysphoric mood. Negative for behavioral problems, hallucinations, self-injury, sleep disturbance and suicidal ideas. The patient is not hyperactive.        Objective:    /72 (BP Location: Right arm, Patient Position: Sitting)   Pulse 66   Temp 97 °F (36.1 °C) (Temporal)   Resp 18   Wt 87.7 kg (193 lb 4 oz)   SpO2 98%   BMI 33.17 kg/m²     Physical Exam   Constitutional: She is oriented to person, place, and time. She appears well-developed and well-nourished. She is cooperative.  Non-toxic appearance. She does not have a sickly appearance. She does not appear ill. No distress.   HENT:   Head: Normocephalic and atraumatic.   Right Ear: External ear normal.   Left Ear: External ear normal.   Nose: Nose normal.   Mouth/Throat: Uvula is midline, oropharynx is clear and moist and mucous membranes are normal.   Eyes: Conjunctivae and lids are normal.   Neck: Normal range of motion. Neck supple. Carotid bruit is not present. No thyromegaly present.   Cardiovascular: Normal rate, regular rhythm and normal heart sounds. Exam reveals no gallop and no friction rub.   No murmur heard.  Pulmonary/Chest: Effort normal and breath sounds normal.   Abdominal: Soft. Bowel sounds are normal. She exhibits no distension, no abdominal bruit and no mass. There is no hepatosplenomegaly. There is no tenderness.   Neurological: She is alert and oriented to person, place, and time.   Skin: Skin is warm, dry and intact.   Psychiatric: She has a normal mood and affect. Her speech is normal and behavior is normal. Thought content normal.   Nursing note and vitals  reviewed.      Assessment/Plan:  PHQ-9 Depression Screening  Little interest or pleasure in doing things?   0   Feeling down, depressed, or hopeless?  1   Trouble falling or staying asleep, or sleeping too much?  1   Feeling tired or having little energy?  1   Poor appetite or overeating?  1   Feeling bad about yourself - or that you are a failure or have let yourself or your family down?  1   Trouble concentrating on things, such as reading the newspaper or watching television?  0   Moving or speaking so slowly that other people could have noticed? Or the opposite - being so fidgety or restless that you have been moving around a lot more than usual?  0   Thoughts that you would be better off dead, or of hurting yourself in some way?  0   PHQ-9 Total Score  5   If you checked off any problems, how difficult have these problems made it for you to do your work, take care of things at home, or get along with other people?  not super difficult     Denise was seen today for depression.    Diagnoses and all orders for this visit:    Anxiety and depression  -     buPROPion (WELLBUTRIN) 75 MG tablet; Take 1 tablet by mouth 2 (Two) Times a Day.        Return in about 1 month (around 8/29/2019), or if symptoms worsen or fail to improve.

## 2019-07-29 ENCOUNTER — OFFICE VISIT (OUTPATIENT)
Dept: INTERNAL MEDICINE | Facility: CLINIC | Age: 39
End: 2019-07-29

## 2019-07-29 VITALS
WEIGHT: 193.25 LBS | BODY MASS INDEX: 33.17 KG/M2 | HEART RATE: 66 BPM | OXYGEN SATURATION: 98 % | TEMPERATURE: 97 F | RESPIRATION RATE: 18 BRPM | DIASTOLIC BLOOD PRESSURE: 72 MMHG | SYSTOLIC BLOOD PRESSURE: 120 MMHG

## 2019-07-29 DIAGNOSIS — F32.A ANXIETY AND DEPRESSION: Primary | ICD-10-CM

## 2019-07-29 DIAGNOSIS — F41.9 ANXIETY AND DEPRESSION: Primary | ICD-10-CM

## 2019-07-29 PROCEDURE — 99213 OFFICE O/P EST LOW 20 MIN: CPT | Performed by: NURSE PRACTITIONER

## 2019-07-29 RX ORDER — BUPROPION HYDROCHLORIDE 75 MG/1
75 TABLET ORAL 2 TIMES DAILY
Qty: 60 TABLET | Refills: 1 | Status: SHIPPED | OUTPATIENT
Start: 2019-07-29 | End: 2019-09-06 | Stop reason: SINTOL

## 2019-09-04 NOTE — PROGRESS NOTES
"Subjective:    Denise Lambert is a 39 y.o. female.     Chief Complaint   Patient presents with   • Depression     x4 week F/U, stopped wellbutrin cold turkey since she has been experincing anxiety attacks       History of Present Illness   Patient present for follow up after starting Wellbutrin for anxiety and depression at 7/29/2019 visit. She reports Wellbutrin has improved her depression, but increased her anxiety. She stopped \"cold turkey\" on Wednesday 9/4/2019. She states her anxiety is very bad today and she knows this is causing elevation with blood pressure. She has taken Celexa (for postpartum depression), Wellbutrin and Buspar for depression and anxiety. She has taken Buspar twice today for anxiousness. She is concerned about medications for depression and anxiety causing weight gain as she struggles with weight gain normally. She has spoken with intake services at [place] and plans on following up with them. She denies self harm, suicidal or homicidal thoughts.     Current Outpatient Medications:   •  busPIRone (BUSPAR) 5 MG tablet, Take 5 mg by mouth 3 (Three) Times a Day., Disp: , Rfl:   •  diclofenac (FLECTOR) 1.3 % patch patch, Apply 1 patch topically to the appropriate area as directed 2 (Two) Times a Day., Disp: 180 patch, Rfl: 0  •  metFORMIN ER (GLUCOPHAGE-XR) 750 MG 24 hr tablet, Take 750 mg by mouth 2 (Two) Times a Day., Disp: , Rfl:   •  hydrOXYzine (ATARAX) 25 MG tablet, Take 1 tablet by mouth 3 (Three) Times a Day As Needed for Anxiety., Disp: 90 tablet, Rfl: 0     The following portions of the patient's history were reviewed and updated as appropriate: allergies, current medications, past family history, past medical history, past social history, past surgical history and problem list.    Review of Systems   Constitutional: Negative for fatigue and unexpected weight change.   Eyes: Negative for visual disturbance.   Respiratory: Negative for cough, shortness of breath and wheezing.  " "  Cardiovascular: Negative for chest pain, palpitations and leg swelling.        No LEMA, orthopnea, or claudication.   Gastrointestinal: Negative for abdominal pain, blood in stool, constipation, diarrhea, nausea and vomiting.        Denies melena.   Endocrine: Negative for polydipsia and polyuria.   Musculoskeletal: Negative for arthralgias and myalgias.   Neurological: Negative for dizziness, syncope, light-headedness and headaches.        No memory issues.   Psychiatric/Behavioral: Negative for decreased concentration, self-injury, sleep disturbance and suicidal ideas. The patient is nervous/anxious.        Objective:    /84 (BP Location: Left arm, Patient Position: Sitting, Cuff Size: Adult)   Pulse 90   Temp 97.9 °F (36.6 °C) (Temporal)   Resp 19   Ht 162.6 cm (64\")   Wt 89.7 kg (197 lb 12.8 oz)   SpO2 98%   BMI 33.95 kg/m²     Physical Exam   Constitutional: She is oriented to person, place, and time. She appears well-developed and well-nourished. She is active and cooperative.  Non-toxic appearance. She does not have a sickly appearance. She does not appear ill. No distress.   HENT:   Head: Normocephalic and atraumatic.   Right Ear: External ear normal.   Left Ear: External ear normal.   Nose: Nose normal.   Mouth/Throat: Uvula is midline, oropharynx is clear and moist and mucous membranes are normal.   Eyes: Conjunctivae and lids are normal.   Neck: Normal range of motion. Neck supple. Carotid bruit is not present. No thyromegaly present.   Cardiovascular: Normal rate, regular rhythm and normal heart sounds. Exam reveals no gallop and no friction rub.   No murmur heard.  Pulmonary/Chest: Effort normal and breath sounds normal.   Abdominal: Soft. Bowel sounds are normal. She exhibits no distension, no abdominal bruit and no mass. There is no hepatosplenomegaly. There is no tenderness.   Neurological: She is alert and oriented to person, place, and time.   Skin: Skin is warm, dry and intact. No " rash noted.   Psychiatric: Her mood appears anxious.   Nursing note and vitals reviewed.      Assessment/Plan:    Denise was seen today for depression.    Diagnoses and all orders for this visit:    Anxiety  -     hydrOXYzine (ATARAX) 25 MG tablet; Take 1 tablet by mouth 3 (Three) Times a Day As Needed for Anxiety.    Anxiety and depression    Single episode of elevated blood pressure    Discussed to not take Buspar and hydroxyzine together. Patient to check blood pressure over the weekend. Patient to maintain follow through with intake at [facility].     Return in about 1 week (around 9/13/2019), or if symptoms worsen or fail to improve.

## 2019-09-06 ENCOUNTER — OFFICE VISIT (OUTPATIENT)
Dept: INTERNAL MEDICINE | Facility: CLINIC | Age: 39
End: 2019-09-06

## 2019-09-06 VITALS
SYSTOLIC BLOOD PRESSURE: 142 MMHG | HEART RATE: 90 BPM | DIASTOLIC BLOOD PRESSURE: 84 MMHG | OXYGEN SATURATION: 98 % | WEIGHT: 197.8 LBS | RESPIRATION RATE: 19 BRPM | HEIGHT: 64 IN | TEMPERATURE: 97.9 F | BODY MASS INDEX: 33.77 KG/M2

## 2019-09-06 DIAGNOSIS — F32.A ANXIETY AND DEPRESSION: ICD-10-CM

## 2019-09-06 DIAGNOSIS — F41.9 ANXIETY AND DEPRESSION: ICD-10-CM

## 2019-09-06 DIAGNOSIS — F41.9 ANXIETY: Primary | ICD-10-CM

## 2019-09-06 DIAGNOSIS — R03.0 SINGLE EPISODE OF ELEVATED BLOOD PRESSURE: ICD-10-CM

## 2019-09-06 PROCEDURE — 99213 OFFICE O/P EST LOW 20 MIN: CPT | Performed by: NURSE PRACTITIONER

## 2019-09-06 RX ORDER — HYDROXYZINE HYDROCHLORIDE 25 MG/1
25 TABLET, FILM COATED ORAL 3 TIMES DAILY PRN
Qty: 90 TABLET | Refills: 0 | Status: SHIPPED | OUTPATIENT
Start: 2019-09-06 | End: 2020-03-09 | Stop reason: SDDI

## 2019-09-06 RX ORDER — VALACYCLOVIR HYDROCHLORIDE 500 MG/1
TABLET, FILM COATED ORAL
COMMUNITY
Start: 2019-08-28 | End: 2019-09-06

## 2020-03-09 ENCOUNTER — OFFICE VISIT (OUTPATIENT)
Dept: INTERNAL MEDICINE | Facility: CLINIC | Age: 40
End: 2020-03-09

## 2020-03-09 VITALS
OXYGEN SATURATION: 97 % | WEIGHT: 203.8 LBS | TEMPERATURE: 97.5 F | RESPIRATION RATE: 18 BRPM | HEIGHT: 64 IN | DIASTOLIC BLOOD PRESSURE: 80 MMHG | HEART RATE: 89 BPM | BODY MASS INDEX: 34.79 KG/M2 | SYSTOLIC BLOOD PRESSURE: 140 MMHG

## 2020-03-09 DIAGNOSIS — J02.9 PHARYNGITIS, UNSPECIFIED ETIOLOGY: Primary | ICD-10-CM

## 2020-03-09 LAB
EXPIRATION DATE: NORMAL
EXPIRATION DATE: NORMAL
FLUAV AG NPH QL: NEGATIVE
FLUBV AG NPH QL: NEGATIVE
INTERNAL CONTROL: NORMAL
INTERNAL CONTROL: NORMAL
Lab: NORMAL
Lab: NORMAL
S PYO AG THROAT QL: NEGATIVE

## 2020-03-09 PROCEDURE — 87804 INFLUENZA ASSAY W/OPTIC: CPT | Performed by: INTERNAL MEDICINE

## 2020-03-09 PROCEDURE — 99213 OFFICE O/P EST LOW 20 MIN: CPT | Performed by: INTERNAL MEDICINE

## 2020-03-09 PROCEDURE — 87880 STREP A ASSAY W/OPTIC: CPT | Performed by: INTERNAL MEDICINE

## 2020-03-09 PROCEDURE — 87081 CULTURE SCREEN ONLY: CPT | Performed by: INTERNAL MEDICINE

## 2020-03-09 RX ORDER — LAMOTRIGINE 150 MG/1
TABLET ORAL
COMMUNITY
Start: 2020-03-04

## 2020-03-09 RX ORDER — LISDEXAMFETAMINE DIMESYLATE 60 MG/1
CAPSULE ORAL
COMMUNITY
Start: 2020-03-04

## 2020-03-09 NOTE — PROGRESS NOTES
"Subjective       Denise Lambert is a 39 y.o. female.     Chief Complaint   Patient presents with   • Sore Throat     x3 days fatigue, hard to swallow, headache        History obtained from the patient.      Sore Throat    This is a new problem. Episode onset: 2 days ago. The problem has been unchanged. The pain is worse on the right side. There has been no fever. The pain is moderate. Associated symptoms include coughing (dry), headaches, a hoarse voice, a plugged ear sensation and swollen glands. Pertinent negatives include no abdominal pain, congestion, diarrhea, ear discharge, ear pain, neck pain, shortness of breath or vomiting. She has had exposure to strep. She has had no exposure to mono. 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 She has tried NSAIDs for the symptoms. The treatment provided mild relief.        The following portions of the patient's history were reviewed and updated as appropriate: allergies, current medications, past family history, past medical history, past social history, past surgical history and problem list.      Review of Systems   Constitutional: Positive for appetite change, chills and fatigue. Negative for fever.   HENT: Positive for hoarse voice, sore throat and voice change. Negative for congestion, ear discharge, ear pain, postnasal drip, rhinorrhea, sinus pressure, sinus pain and sneezing.    Eyes: Negative for pain, discharge, redness and itching.   Respiratory: Positive for cough (dry). Negative for shortness of breath and wheezing.    Cardiovascular: Negative for chest pain.   Gastrointestinal: Negative for abdominal pain, diarrhea, nausea and vomiting.   Musculoskeletal: Negative for arthralgias, myalgias, neck pain and neck stiffness.   Neurological: Positive for headaches.   Hematological: Negative for adenopathy.           Objective     Blood pressure 140/80, pulse 89, temperature 97.5 °F (36.4 °C), temperature source Temporal, resp. rate 18, height 162.6 cm (64\"), " weight 92.4 kg (203 lb 12.8 oz), SpO2 97 %.    Physical Exam   Constitutional:   Obese     HENT:   Head: Normocephalic and atraumatic.   Right Ear: Tympanic membrane, external ear and ear canal normal.   Left Ear: Tympanic membrane, external ear and ear canal normal.   Mouth/Throat: Oropharynx is clear and moist and mucous membranes are normal. No oral lesions.   Tonsils normal.  Bilateral maxillary and frontal  tenderness to palpation.   Eyes: Conjunctivae are normal.   Neck: Normal range of motion. Neck supple.   Cardiovascular: Normal rate and regular rhythm.   No murmur heard.  Pulmonary/Chest: Effort normal and breath sounds normal.   Lymphadenopathy:     Cervical adenopathy: bilateral tenderness in the anterior, but not posterior, cervical area.   Skin: No rash noted.   Psychiatric: She has a normal mood and affect.   Nursing note and vitals reviewed.    Results for orders placed or performed in visit on 03/09/20   POCT rapid strep A   Result Value Ref Range    Rapid Strep A Screen Negative Negative, VALID, INVALID, Not Performed    Internal Control Passed Passed    Lot Number [id number]     Expiration Date 4-30-21    POC Influenza A / B   Result Value Ref Range    Rapid Influenza A Ag Negative Negative    Rapid Influenza B Ag Negative Negative    Internal Control Passed Passed    Lot Number [id number]     Expiration Date 6-30-21            Assessment/Plan   Denise was seen today for sore throat.    Diagnoses and all orders for this visit:    Pharyngitis, unspecified etiology  -     POCT rapid strep A  -     Beta Strep Culture, Throat - Swab, Throat  -     POC Influenza A / B        Recommend Mucinex, Allegra, Flonase, and plenty of fluids.    Return if symptoms worsen or fail to improve.

## 2020-03-11 LAB — BACTERIA SPEC AEROBE CULT: NORMAL

## 2020-03-16 ENCOUNTER — OFFICE VISIT (OUTPATIENT)
Dept: INTERNAL MEDICINE | Facility: CLINIC | Age: 40
End: 2020-03-16

## 2020-03-16 ENCOUNTER — HOSPITAL ENCOUNTER (OUTPATIENT)
Dept: GENERAL RADIOLOGY | Facility: HOSPITAL | Age: 40
Discharge: HOME | End: 2020-03-16
Admitting: NURSE PRACTITIONER

## 2020-03-16 ENCOUNTER — RESULTS ENCOUNTER (OUTPATIENT)
Dept: INTERNAL MEDICINE | Facility: CLINIC | Age: 40
End: 2020-03-16

## 2020-03-16 ENCOUNTER — TELEPHONE (OUTPATIENT)
Dept: INTERNAL MEDICINE | Facility: CLINIC | Age: 40
End: 2020-03-16

## 2020-03-16 VITALS
TEMPERATURE: 98.5 F | DIASTOLIC BLOOD PRESSURE: 90 MMHG | RESPIRATION RATE: 18 BRPM | SYSTOLIC BLOOD PRESSURE: 130 MMHG | HEART RATE: 81 BPM | OXYGEN SATURATION: 98 %

## 2020-03-16 DIAGNOSIS — J18.9 PNEUMONIA DUE TO INFECTIOUS ORGANISM, UNSPECIFIED LATERALITY, UNSPECIFIED PART OF LUNG: Primary | ICD-10-CM

## 2020-03-16 DIAGNOSIS — R05.9 COUGH: ICD-10-CM

## 2020-03-16 DIAGNOSIS — J06.9 ACUTE URI: ICD-10-CM

## 2020-03-16 DIAGNOSIS — J18.9 PNEUMONIA DUE TO INFECTIOUS ORGANISM, UNSPECIFIED LATERALITY, UNSPECIFIED PART OF LUNG: ICD-10-CM

## 2020-03-16 PROCEDURE — 87635 SARS-COV-2 COVID-19 AMP PRB: CPT | Performed by: EMERGENCY MEDICINE

## 2020-03-16 PROCEDURE — 71046 X-RAY EXAM CHEST 2 VIEWS: CPT

## 2020-03-16 PROCEDURE — 99214 OFFICE O/P EST MOD 30 MIN: CPT | Performed by: NURSE PRACTITIONER

## 2020-03-16 PROCEDURE — U0003 INFECTIOUS AGENT DETECTION BY NUCLEIC ACID (DNA OR RNA); SEVERE ACUTE RESPIRATORY SYNDROME CORONAVIRUS 2 (SARS-COV-2) (CORONAVIRUS DISEASE [COVID-19]), AMPLIFIED PROBE TECHNIQUE, MAKING USE OF HIGH THROUGHPUT TECHNOLOGIES AS DESCRIBED BY CMS-2020-01-R: HCPCS | Performed by: EMERGENCY MEDICINE

## 2020-03-16 RX ORDER — DOXYCYCLINE HYCLATE 100 MG/1
100 CAPSULE ORAL 2 TIMES DAILY
Qty: 20 CAPSULE | Refills: 0 | Status: CANCELLED | OUTPATIENT
Start: 2020-03-16

## 2020-03-16 RX ORDER — CLARITHROMYCIN 500 MG/1
500 TABLET, COATED ORAL 2 TIMES DAILY
Qty: 20 TABLET | Refills: 0 | Status: SHIPPED | OUTPATIENT
Start: 2020-03-16 | End: 2020-07-27

## 2020-03-16 RX ORDER — METHYLPREDNISOLONE 4 MG/1
TABLET ORAL
Qty: 1 EACH | Refills: 0 | Status: CANCELLED | OUTPATIENT
Start: 2020-03-16

## 2020-03-16 RX ORDER — BENZONATATE 200 MG/1
200 CAPSULE ORAL 3 TIMES DAILY PRN
Qty: 30 CAPSULE | Refills: 0 | Status: CANCELLED | OUTPATIENT
Start: 2020-03-16

## 2020-03-16 RX ORDER — BENZONATATE 200 MG/1
200 CAPSULE ORAL 3 TIMES DAILY PRN
Qty: 30 CAPSULE | Refills: 0 | Status: SHIPPED | OUTPATIENT
Start: 2020-03-16 | End: 2020-07-27

## 2020-03-16 NOTE — TELEPHONE ENCOUNTER
Patient called in regarding possible COVID19. 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 Stated she was in last Monday and saw Dr. Sneed both strep and flu were negative and she is feeling worse with fatigue, SOA, wheezing, fever and a cough that will not stop.

## 2020-03-27 ENCOUNTER — TELEPHONE (OUTPATIENT)
Dept: INTERNAL MEDICINE | Facility: CLINIC | Age: 40
End: 2020-03-27

## 2020-03-27 NOTE — TELEPHONE ENCOUNTER
Denise Lambert [phone]  [facility] requesting pt call back regarding. Office number given for call back, [phone].        Please let pt know covid 19 test is negative and see how she is doing.

## 2020-03-28 ENCOUNTER — TELEPHONE (OUTPATIENT)
Dept: URGENT CARE | Facility: CLINIC | Age: 40
End: 2020-03-28

## 2020-03-30 NOTE — TELEPHONE ENCOUNTER
Spoke to patient and gave results she verbalized good understanding and stated that the [facility] called her with the results as well. Patient stated that she is doing much better.

## 2020-07-27 ENCOUNTER — OFFICE VISIT (OUTPATIENT)
Dept: INTERNAL MEDICINE | Facility: CLINIC | Age: 40
End: 2020-07-27

## 2020-07-27 VITALS
DIASTOLIC BLOOD PRESSURE: 90 MMHG | RESPIRATION RATE: 18 BRPM | TEMPERATURE: 98.6 F | HEIGHT: 63 IN | WEIGHT: 195 LBS | HEART RATE: 85 BPM | OXYGEN SATURATION: 97 % | BODY MASS INDEX: 34.55 KG/M2 | SYSTOLIC BLOOD PRESSURE: 130 MMHG

## 2020-07-27 DIAGNOSIS — Z13.29 THYROID DISORDER SCREEN: ICD-10-CM

## 2020-07-27 DIAGNOSIS — E66.01 CLASS 2 SEVERE OBESITY DUE TO EXCESS CALORIES WITH SERIOUS COMORBIDITY AND BODY MASS INDEX (BMI) OF 36.0 TO 36.9 IN ADULT (HCC): ICD-10-CM

## 2020-07-27 DIAGNOSIS — Z00.00 ANNUAL PHYSICAL EXAM: ICD-10-CM

## 2020-07-27 DIAGNOSIS — Z13.220 LIPID SCREENING: ICD-10-CM

## 2020-07-27 DIAGNOSIS — R82.998 LEUKOCYTES IN URINE: Primary | ICD-10-CM

## 2020-07-27 DIAGNOSIS — Z11.59 NEED FOR HEPATITIS C SCREENING TEST: ICD-10-CM

## 2020-07-27 LAB
BASOPHILS # BLD AUTO: 0.02 10*3/MM3 (ref 0–0.2)
BASOPHILS NFR BLD AUTO: 0.3 % (ref 0–1.5)
BILIRUB BLD-MCNC: NEGATIVE MG/DL
CLARITY, POC: CLEAR
COLOR UR: YELLOW
DEPRECATED RDW RBC AUTO: 37.6 FL (ref 37–54)
EOSINOPHIL # BLD AUTO: 0.04 10*3/MM3 (ref 0–0.4)
EOSINOPHIL NFR BLD AUTO: 0.6 % (ref 0.3–6.2)
ERYTHROCYTE [DISTWIDTH] IN BLOOD BY AUTOMATED COUNT: 12.9 % (ref 12.3–15.4)
EXPIRATION DATE: ABNORMAL
GLUCOSE UR STRIP-MCNC: NEGATIVE MG/DL
HCT VFR BLD AUTO: 37.3 % (ref 34–46.6)
HGB BLD-MCNC: 13.1 G/DL (ref 12–15.9)
IMM GRANULOCYTES # BLD AUTO: 0.02 10*3/MM3 (ref 0–0.05)
IMM GRANULOCYTES NFR BLD AUTO: 0.3 % (ref 0–0.5)
KETONES UR QL: NEGATIVE
LEUKOCYTE EST, POC: ABNORMAL
LYMPHOCYTES # BLD AUTO: 2.12 10*3/MM3 (ref 0.7–3.1)
LYMPHOCYTES NFR BLD AUTO: 30.1 % (ref 19.6–45.3)
Lab: ABNORMAL
MCH RBC QN AUTO: 28.5 PG (ref 26.6–33)
MCHC RBC AUTO-ENTMCNC: 35.1 G/DL (ref 31.5–35.7)
MCV RBC AUTO: 81.3 FL (ref 79–97)
MONOCYTES # BLD AUTO: 0.27 10*3/MM3 (ref 0.1–0.9)
MONOCYTES NFR BLD AUTO: 3.8 % (ref 5–12)
NEUTROPHILS NFR BLD AUTO: 4.58 10*3/MM3 (ref 1.7–7)
NEUTROPHILS NFR BLD AUTO: 64.9 % (ref 42.7–76)
NITRITE UR-MCNC: NEGATIVE MG/ML
NRBC BLD AUTO-RTO: 0 /100 WBC (ref 0–0.2)
PH UR: 5 [PH] (ref 5–8)
PLATELET # BLD AUTO: 290 10*3/MM3 (ref 140–450)
PMV BLD AUTO: 9.4 FL (ref 6–12)
PROT UR STRIP-MCNC: NEGATIVE MG/DL
RBC # BLD AUTO: 4.59 10*6/MM3 (ref 3.77–5.28)
RBC # UR STRIP: ABNORMAL /UL
SP GR UR: 1.01 (ref 1–1.03)
UROBILINOGEN UR QL: NORMAL
WBC # BLD AUTO: 7.05 10*3/MM3 (ref 3.4–10.8)

## 2020-07-27 PROCEDURE — 85025 COMPLETE CBC W/AUTO DIFF WBC: CPT | Performed by: NURSE PRACTITIONER

## 2020-07-27 PROCEDURE — 81003 URINALYSIS AUTO W/O SCOPE: CPT | Performed by: NURSE PRACTITIONER

## 2020-07-27 PROCEDURE — 99395 PREV VISIT EST AGE 18-39: CPT | Performed by: NURSE PRACTITIONER

## 2020-07-27 PROCEDURE — 84443 ASSAY THYROID STIM HORMONE: CPT | Performed by: NURSE PRACTITIONER

## 2020-07-27 PROCEDURE — 80061 LIPID PANEL: CPT | Performed by: NURSE PRACTITIONER

## 2020-07-27 PROCEDURE — 87086 URINE CULTURE/COLONY COUNT: CPT | Performed by: NURSE PRACTITIONER

## 2020-07-27 PROCEDURE — 80053 COMPREHEN METABOLIC PANEL: CPT | Performed by: NURSE PRACTITIONER

## 2020-07-27 PROCEDURE — 86803 HEPATITIS C AB TEST: CPT | Performed by: NURSE PRACTITIONER

## 2020-07-27 PROCEDURE — 36415 COLL VENOUS BLD VENIPUNCTURE: CPT | Performed by: NURSE PRACTITIONER

## 2020-07-27 RX ORDER — GUANFACINE 1 MG/1
TABLET ORAL
COMMUNITY
Start: 2020-06-01 | End: 2022-06-10

## 2020-07-27 RX ORDER — DROSPIRENONE AND ETHINYL ESTRADIOL 0.02-3(28)
KIT ORAL
COMMUNITY
Start: 2020-07-20 | End: 2021-08-31

## 2020-07-27 RX ORDER — METFORMIN HYDROCHLORIDE 500 MG/1
1000 TABLET, EXTENDED RELEASE ORAL NIGHTLY
COMMUNITY

## 2020-07-28 PROBLEM — E78.1 HYPERTRIGLYCERIDEMIA: Status: ACTIVE | Noted: 2020-07-28

## 2020-07-28 PROBLEM — R31.9 HEMATURIA: Status: ACTIVE | Noted: 2020-07-28

## 2020-07-28 LAB
ALBUMIN SERPL-MCNC: 4.7 G/DL (ref 3.5–5.2)
ALBUMIN/GLOB SERPL: 1.9 G/DL
ALP SERPL-CCNC: 39 U/L (ref 39–117)
ALT SERPL W P-5'-P-CCNC: 14 U/L (ref 1–33)
ANION GAP SERPL CALCULATED.3IONS-SCNC: 11.7 MMOL/L (ref 5–15)
AST SERPL-CCNC: 14 U/L (ref 1–32)
BACTERIA SPEC AEROBE CULT: NO GROWTH
BILIRUB SERPL-MCNC: 0.4 MG/DL (ref 0–1.2)
BUN SERPL-MCNC: 9 MG/DL (ref 6–20)
BUN/CREAT SERPL: 13.4 (ref 7–25)
CALCIUM SPEC-SCNC: 9.2 MG/DL (ref 8.6–10.5)
CHLORIDE SERPL-SCNC: 105 MMOL/L (ref 98–107)
CHOLEST SERPL-MCNC: 221 MG/DL (ref 0–200)
CO2 SERPL-SCNC: 22.3 MMOL/L (ref 22–29)
CREAT SERPL-MCNC: 0.67 MG/DL (ref 0.57–1)
GFR SERPL CREATININE-BSD FRML MDRD: 99 ML/MIN/1.73
GLOBULIN UR ELPH-MCNC: 2.5 GM/DL
GLUCOSE SERPL-MCNC: 78 MG/DL (ref 65–99)
HCV AB SER DONR QL: NORMAL
HDLC SERPL-MCNC: 46 MG/DL (ref 40–60)
LDLC SERPL CALC-MCNC: 126 MG/DL (ref 0–100)
LDLC/HDLC SERPL: 2.74 {RATIO}
POTASSIUM SERPL-SCNC: 3.9 MMOL/L (ref 3.5–5.2)
PROT SERPL-MCNC: 7.2 G/DL (ref 6–8.5)
SODIUM SERPL-SCNC: 139 MMOL/L (ref 136–145)
TRIGL SERPL-MCNC: 244 MG/DL (ref 0–150)
TSH SERPL DL<=0.05 MIU/L-ACNC: 3.73 UIU/ML (ref 0.27–4.2)
VLDLC SERPL-MCNC: 48.8 MG/DL (ref 5–40)

## 2020-08-09 PROBLEM — E78.49 OTHER HYPERLIPIDEMIA: Status: ACTIVE | Noted: 2020-07-28

## 2020-08-12 ENCOUNTER — HOSPITAL ENCOUNTER (OUTPATIENT)
Dept: NUTRITION | Facility: HOSPITAL | Age: 40
Setting detail: RECURRING SERIES
Discharge: HOME | End: 2020-08-12

## 2020-08-12 PROCEDURE — 97802 MEDICAL NUTRITION INDIV IN: CPT

## 2020-08-13 VITALS — WEIGHT: 195 LBS | BODY MASS INDEX: 34.55 KG/M2 | HEIGHT: 63 IN

## 2020-08-13 NOTE — CONSULTS
"Adult Outpatient Nutrition  Assessment/PES    Patient Name:  Denise Lambert  YOB: 1980  MRN: [id number]    Assessment Date:  8/13/2020    Comments:  This medical referred consult was provided via telehealth as patient was unable to attend an in-office appointment today due to the COVID-19 crisis. Consent for treatment was given verbally. RD spent a total of 60 minutes with patient today. Patient was able to complete health literacy assessment prior to today's appointment. She scored a 6/6 on this, promoting adequate health literacy.     Patient presents today with concerns regarding her difficulties losing weight over the years. Patient has PCOS and has tried multiple diets such as weight watchers and \"Ideal protein\" (per pt this is a restrictive keto approach), pt is currently implementing intermittent fasting. RD spoke with pt about the importance of healthy lifestyle changes to elicit successful weight loss. Per pt's 24 hr recall- she has been consuming <1000 calories per day. Patient reports she consumes no more than 1200 kcal on any given day. RD spoke with pt about the importance of consuming enough calories for our bodies to properly shed excess fat. She verbalizes understanding of this.     Patient reports that she was active prior to pandemic, going to the gym daily and using the elliptical for 60 minutes per day. Patient reports she has not participated in any physical activity since early spring. RD spoke with pt about the key importance of physical activity in decreasing the insulin resistance associated with PCOS. Pt reports she is planning to start going back to the gym soon.     Instructional process includes the plate method, nutrition label, meal planning, portions, restaurant nutrition, food record keeping, and exercise.    Materials provided include [facility] Weight Loss Toolkit, 1500 kcal meal plan and sample menu, and supporting nutrition education materials. " "    Goals:  1. Lose 1 lb per week- pt will weigh self weekly.   2. Increase physical activity- 3 times per week, 30 minutes per day.   3. Keep food diary on My Fitness Pal 7 times per week.     Total of 60  minutes spent with patient on nutrition counseling. Education based on Academy of Dietetics and Nutrition guidelines. Patient was provided with RD's contact information. Follow up visit is scheduled for 9/14/20  At 4:00 PM . Thank you for this referral.    General Info     Row Name 08/13/20 0813       Today's Session    Person(s) attending today's session  Patient     Services Used Today?  No       General Information    How Well Do You Speak English?  very well    Do You Speak a Language Other Than English at Home?  no    Preferred Language  English    Are you able to read and write English?  Yes    Lives With  spouse    Last grade of school completed  some college     Is patient pregnant?  no        Physical Findings     Row Name 08/13/20 0813          Physical Findings    Overall Physical Appearance  obese         Anthropometrics     Row Name 08/13/20 0856 08/13/20 0814       Anthropometrics    Height  160 cm (63\")  160 cm (63\")    Weight  --  88.5 kg (195 lb)       Ideal Body Weight (IBW)    Ideal Body Weight (IBW) (kg)  52.72  52.72    % Ideal Body Weight  --  167.78       Body Mass Index (BMI)    BMI (kg/m2)  --  34.61        Nutritional Info/Activity     Row Name 08/13/20 0816       Nutritional Information    Have you had weight changes?  Yes    Describe weight changes  pt reports that she has PCOS and has struggled to control her weight her entire life.     What is your desired body weight?  63.5 kg (140 lb)    Have you tried to lose weight before?  Yes    List programs tried, date, and success  weight watchers 2 years ago (20 lb loss); ideal protein (10 lb loss); intermittent fasting (currently doing this, has lost 5 lb).     What is your motivation to lose weight?  \"My health\"    Food " "Allergies  -- NKFA    History of eating disorder?  Other (comment) Binge Eating Disorder     What cultural diet influences are important for you to follow?  none     Do you have difficulty chewing food?  No    Functional Status  able to prepare meals;able to purchase food;ambulatory    List any food cravings/trigger foods you have  pt reports that 1 week prior to menstrual cycle she craves sugar, espescially if she has been eating really well.     List any food aversions  pt reports that she is a \"picky\" eater; does not like fish or many vegetables.     How often during the day do you find yourself snacking?  0    Food Behaviors  Stress eater;Boredom eater    How often do you eat out and where?  [employer] 2x per month; chicken salad and potatoes     Do you use Food Assistance programs (WIC, food stamps, food bank)?  no    Do you need information about Food Assistance programs?  no    How many times do you drink milk per day?  0    How many times do you eat fruit per day?  1    How many times do you eat vegetables per day?  2    How many times do you drink juice per day?  0    How many times do you eat candy/chocolates per day?  0    How many times do you eat baked goods per day?  0    How many times do you eat desserts per day?  0    How many times do you eat ice cream per day?  0    How many times do you eat snack foods per day?  0    How many diet sodas do you drink per day?  0    How many regular sodas do you drink per day?  0    How many times do you eat ethnic food per day?  0    How many times do you drink alcohol per day?  0    How many times do you have caffeine per day?  0    How many servings of artificial sweetner do you have per day?  0    How many meals do you eat each day?  2    How many snacks do you eat each day?  0    What is the biggest challenge you have with your diet?  Weight maintenance    What type of support do you currently use to help you with your health issues?  My fitness pal    Enter " "everything you can remember eating in the last 24 hours (1 day)  3PM: potato soup and 1/2 turkey wrap; D: chicken with BBQ sauce, salad with bolthouse ranch greek yogurt dressing; 64 oz H2O consumed throughout the day.        Eating Environment    Eating environment  Family;Work       Physical Activity    Are you currently involved in an activity/exercise program?   No    Describe physical activity  pt reports prior to pandemic she was going to the gym daily and using the eliptical for 60 minutes. pt reports since spring she has not paritcipated in any physical activity.     Reasons for Inactivity  Other (comment) Pandemic, gym closing, work restraints    How many minutes do you spend on exercise each day?  -- pt was spending 60 min per day prior to pandemic, at this time- 0 minutes    How would you rank exercise as an important health lifestyle practice?  10          Estimated/Assessed Needs     Row Name 08/13/20 0856 08/13/20 0814       Calculation Measurements    Weight Used For Calculations  88.5 kg (195 lb)  --    Height  160 cm (63\")  160 cm (63\")       Estimated/Assessed Needs    Additional Documentation  [place]-St. Jeor Equation (Group)  --       R[place]-St. Jeor Equation    RMR ([place]-St. Jeor Equation)  1533.638  --    R[place]-St. Jeor Activity Factors  -- 1.3  --                Problem/Interventions:  Problem 1     Row Name 08/13/20 0856          Nutrition Diagnoses Problem 1    Problem 1  Overweight/Obesity     Etiology (related to)  Factors Affecting Nutrition     Condition  Other PCOS     Poor Intake of  -- pt reports she is a \"picky eater\"     Signs/Symptoms (evidenced by)  BMI;Report/Observation     BMI  30 - 34.9                 Intervention Goal     Row Name 08/13/20 0858          Intervention Goal    General  Provide information regarding MNT for treatment/condition     PO  Meet estimated needs     Weight  Appropriate weight loss           Nutrition Prescription     Row Name 08/13/20 0859       "    Nutrition Prescription PO    PO Prescription  Begin/change diet     Begin/Change Diet to  Regular     Fluid Consistency  Thin     New PO Prescription Ordered?  No, recommended         Education/Evaluation     Row Name 08/13/20 0899          Education    Education  Provided education regarding     Provided education regarding  Medical diagnosis;Key food habit change;Preventative health        Monitor/Evaluation    Monitor  Per protocol;PO intake;Weight;Food/activity diary     Education Follow-up  Reinforce PRN will reinforce education @ follow up 9/14 @ 4PM.            Electronically signed by:  Christiana Cruz RD  08/13/20 09:03

## 2021-03-26 ENCOUNTER — TELEPHONE (OUTPATIENT)
Dept: INTERNAL MEDICINE | Facility: CLINIC | Age: 41
End: 2021-03-26

## 2021-03-26 NOTE — TELEPHONE ENCOUNTER
Left voice mail message for Pt to return call to office for message from PCP. Office number given.    HUB OKAY TO RELAY MESSAGE TO PT    Your labs overall are stable.  Your A1c is 5 your uric acid level is 5 which are both in normal range.  Your cholesterol however is elevated.  Your triglycerides are 204 and your LDL is 150 which are higher than would be preferable.  I would like for you to consider starting a statin.    Phone call to patient regarding starting a statin due to uncontrolled cholesterol.  Please see if patient is agreeable

## 2021-03-26 NOTE — TELEPHONE ENCOUNTER
----- Message from RACHELL Garnett sent at 3/25/2021  1:05 PM EDT -----  Phone call to patient regarding starting a statin due to uncontrolled cholesterol.  Please see if patient is agreeable.

## 2021-08-31 ENCOUNTER — OFFICE VISIT (OUTPATIENT)
Dept: INTERNAL MEDICINE | Facility: CLINIC | Age: 41
End: 2021-08-31

## 2021-08-31 VITALS
RESPIRATION RATE: 16 BRPM | SYSTOLIC BLOOD PRESSURE: 122 MMHG | WEIGHT: 192.8 LBS | HEART RATE: 79 BPM | BODY MASS INDEX: 34.15 KG/M2 | OXYGEN SATURATION: 99 % | DIASTOLIC BLOOD PRESSURE: 82 MMHG | TEMPERATURE: 97.3 F

## 2021-08-31 DIAGNOSIS — Z13.220 LIPID SCREENING: ICD-10-CM

## 2021-08-31 DIAGNOSIS — F41.9 ANXIETY AND DEPRESSION: ICD-10-CM

## 2021-08-31 DIAGNOSIS — E28.2 PCOS (POLYCYSTIC OVARIAN SYNDROME): ICD-10-CM

## 2021-08-31 DIAGNOSIS — F32.A ANXIETY AND DEPRESSION: ICD-10-CM

## 2021-08-31 DIAGNOSIS — Z00.00 ANNUAL PHYSICAL EXAM: Primary | ICD-10-CM

## 2021-08-31 DIAGNOSIS — E66.01 CLASS 2 SEVERE OBESITY DUE TO EXCESS CALORIES WITH SERIOUS COMORBIDITY AND BODY MASS INDEX (BMI) OF 36.0 TO 36.9 IN ADULT (HCC): ICD-10-CM

## 2021-08-31 DIAGNOSIS — Z13.29 THYROID DISORDER SCREEN: ICD-10-CM

## 2021-08-31 PROCEDURE — 99395 PREV VISIT EST AGE 18-39: CPT | Performed by: NURSE PRACTITIONER

## 2021-09-08 ENCOUNTER — LAB (OUTPATIENT)
Dept: INTERNAL MEDICINE | Facility: CLINIC | Age: 41
End: 2021-09-08

## 2021-09-08 ENCOUNTER — LAB (OUTPATIENT)
Dept: LAB | Facility: HOSPITAL | Age: 41
End: 2021-09-08

## 2021-09-08 DIAGNOSIS — R82.90 ABNORMAL URINE: Primary | ICD-10-CM

## 2021-09-08 DIAGNOSIS — Z00.00 ANNUAL PHYSICAL EXAM: ICD-10-CM

## 2021-09-08 DIAGNOSIS — Z13.220 LIPID SCREENING: ICD-10-CM

## 2021-09-08 DIAGNOSIS — Z13.29 THYROID DISORDER SCREEN: ICD-10-CM

## 2021-09-08 DIAGNOSIS — R31.9 HEMATURIA, UNSPECIFIED TYPE: Primary | ICD-10-CM

## 2021-09-08 DIAGNOSIS — R82.998 LEUKOCYTES IN URINE: ICD-10-CM

## 2021-09-08 LAB
ALBUMIN SERPL-MCNC: 4.4 G/DL (ref 3.5–5.2)
ALBUMIN/GLOB SERPL: 1.7 G/DL
ALP SERPL-CCNC: 65 U/L (ref 39–117)
ALT SERPL W P-5'-P-CCNC: 8 U/L (ref 1–33)
ANION GAP SERPL CALCULATED.3IONS-SCNC: 8.8 MMOL/L (ref 5–15)
AST SERPL-CCNC: 12 U/L (ref 1–32)
BASOPHILS # BLD AUTO: 0.02 10*3/MM3 (ref 0–0.2)
BASOPHILS NFR BLD AUTO: 0.3 % (ref 0–1.5)
BILIRUB BLD-MCNC: NEGATIVE MG/DL
BILIRUB SERPL-MCNC: 0.3 MG/DL (ref 0–1.2)
BUN SERPL-MCNC: 9 MG/DL (ref 6–20)
BUN/CREAT SERPL: 11.8 (ref 7–25)
CALCIUM SPEC-SCNC: 9 MG/DL (ref 8.6–10.5)
CHLORIDE SERPL-SCNC: 103 MMOL/L (ref 98–107)
CHOLEST SERPL-MCNC: 240 MG/DL (ref 0–200)
CLARITY, POC: CLEAR
CO2 SERPL-SCNC: 25.2 MMOL/L (ref 22–29)
COLOR UR: ABNORMAL
CREAT SERPL-MCNC: 0.76 MG/DL (ref 0.57–1)
DEPRECATED RDW RBC AUTO: 40.9 FL (ref 37–54)
EOSINOPHIL # BLD AUTO: 0.13 10*3/MM3 (ref 0–0.4)
EOSINOPHIL NFR BLD AUTO: 2 % (ref 0.3–6.2)
ERYTHROCYTE [DISTWIDTH] IN BLOOD BY AUTOMATED COUNT: 13.1 % (ref 12.3–15.4)
EXPIRATION DATE: ABNORMAL
GFR SERPL CREATININE-BSD FRML MDRD: 85 ML/MIN/1.73
GLOBULIN UR ELPH-MCNC: 2.6 GM/DL
GLUCOSE SERPL-MCNC: 74 MG/DL (ref 65–99)
GLUCOSE UR STRIP-MCNC: NEGATIVE MG/DL
HCT VFR BLD AUTO: 38.7 % (ref 34–46.6)
HDLC SERPL-MCNC: 51 MG/DL (ref 40–60)
HGB BLD-MCNC: 12.9 G/DL (ref 12–15.9)
IMM GRANULOCYTES # BLD AUTO: 0.01 10*3/MM3 (ref 0–0.05)
IMM GRANULOCYTES NFR BLD AUTO: 0.2 % (ref 0–0.5)
KETONES UR QL: NEGATIVE
LDLC SERPL CALC-MCNC: 152 MG/DL (ref 0–100)
LDLC/HDLC SERPL: 2.9 {RATIO}
LEUKOCYTE EST, POC: ABNORMAL
LYMPHOCYTES # BLD AUTO: 2.38 10*3/MM3 (ref 0.7–3.1)
LYMPHOCYTES NFR BLD AUTO: 37.5 % (ref 19.6–45.3)
Lab: ABNORMAL
MCH RBC QN AUTO: 28.7 PG (ref 26.6–33)
MCHC RBC AUTO-ENTMCNC: 33.3 G/DL (ref 31.5–35.7)
MCV RBC AUTO: 86.2 FL (ref 79–97)
MONOCYTES # BLD AUTO: 0.3 10*3/MM3 (ref 0.1–0.9)
MONOCYTES NFR BLD AUTO: 4.7 % (ref 5–12)
NEUTROPHILS NFR BLD AUTO: 3.51 10*3/MM3 (ref 1.7–7)
NEUTROPHILS NFR BLD AUTO: 55.3 % (ref 42.7–76)
NITRITE UR-MCNC: NEGATIVE MG/ML
NRBC BLD AUTO-RTO: 0 /100 WBC (ref 0–0.2)
PH UR: 5 [PH] (ref 5–8)
PLATELET # BLD AUTO: 287 10*3/MM3 (ref 140–450)
PMV BLD AUTO: 8.6 FL (ref 6–12)
POTASSIUM SERPL-SCNC: 4 MMOL/L (ref 3.5–5.2)
PROT SERPL-MCNC: 7 G/DL (ref 6–8.5)
PROT UR STRIP-MCNC: NEGATIVE MG/DL
RBC # BLD AUTO: 4.49 10*6/MM3 (ref 3.77–5.28)
RBC # UR STRIP: ABNORMAL /UL
SODIUM SERPL-SCNC: 137 MMOL/L (ref 136–145)
SP GR UR: 1.02 (ref 1–1.03)
TRIGL SERPL-MCNC: 205 MG/DL (ref 0–150)
UROBILINOGEN UR QL: NORMAL
VLDLC SERPL-MCNC: 37 MG/DL (ref 5–40)
WBC # BLD AUTO: 6.35 10*3/MM3 (ref 3.4–10.8)

## 2021-09-08 PROCEDURE — 84443 ASSAY THYROID STIM HORMONE: CPT | Performed by: NURSE PRACTITIONER

## 2021-09-08 PROCEDURE — 81003 URINALYSIS AUTO W/O SCOPE: CPT | Performed by: NURSE PRACTITIONER

## 2021-09-08 PROCEDURE — 80061 LIPID PANEL: CPT | Performed by: NURSE PRACTITIONER

## 2021-09-08 PROCEDURE — 80053 COMPREHEN METABOLIC PANEL: CPT | Performed by: NURSE PRACTITIONER

## 2021-09-08 PROCEDURE — 81001 URINALYSIS AUTO W/SCOPE: CPT

## 2021-09-08 PROCEDURE — 85025 COMPLETE CBC W/AUTO DIFF WBC: CPT | Performed by: NURSE PRACTITIONER

## 2021-09-09 LAB
BACTERIA UR QL AUTO: ABNORMAL /HPF
BILIRUB UR QL STRIP: NEGATIVE
CLARITY UR: ABNORMAL
COLOR UR: YELLOW
GLUCOSE UR STRIP-MCNC: NEGATIVE MG/DL
HGB UR QL STRIP.AUTO: NEGATIVE
HYALINE CASTS UR QL AUTO: ABNORMAL /LPF
KETONES UR QL STRIP: NEGATIVE
LEUKOCYTE ESTERASE UR QL STRIP.AUTO: NEGATIVE
NITRITE UR QL STRIP: NEGATIVE
PH UR STRIP.AUTO: 5.5 [PH] (ref 5–8)
PROT UR QL STRIP: NEGATIVE
RBC # UR: ABNORMAL /HPF
REF LAB TEST METHOD: ABNORMAL
SP GR UR STRIP: 1.02 (ref 1–1.03)
SQUAMOUS #/AREA URNS HPF: ABNORMAL /HPF
TSH SERPL DL<=0.05 MIU/L-ACNC: 2.8 UIU/ML (ref 0.27–4.2)
UROBILINOGEN UR QL STRIP: ABNORMAL
WBC UR QL AUTO: ABNORMAL /HPF

## 2021-12-10 ENCOUNTER — TELEPHONE (OUTPATIENT)
Dept: INTERNAL MEDICINE | Facility: CLINIC | Age: 41
End: 2021-12-10

## 2021-12-10 NOTE — TELEPHONE ENCOUNTER
Tried to call patient no answer LVM to CB.     HUB OK TO READ: Patient has pending labs that need to be collected. She can come in anytime between 8-4:30 mon thru fri.

## 2022-06-10 ENCOUNTER — TELEMEDICINE (OUTPATIENT)
Dept: INTERNAL MEDICINE | Facility: CLINIC | Age: 42
End: 2022-06-10

## 2022-06-10 DIAGNOSIS — U07.1 COVID: ICD-10-CM

## 2022-06-10 DIAGNOSIS — N30.00 ACUTE CYSTITIS WITHOUT HEMATURIA: Primary | ICD-10-CM

## 2022-06-10 DIAGNOSIS — J01.00 ACUTE MAXILLARY SINUSITIS, RECURRENCE NOT SPECIFIED: ICD-10-CM

## 2022-06-10 PROCEDURE — 99213 OFFICE O/P EST LOW 20 MIN: CPT | Performed by: NURSE PRACTITIONER

## 2022-06-10 RX ORDER — DEXTROMETHORPHAN HYDROBROMIDE AND PROMETHAZINE HYDROCHLORIDE 15; 6.25 MG/5ML; MG/5ML
5 SYRUP ORAL 4 TIMES DAILY PRN
Qty: 60 ML | Refills: 0 | Status: SHIPPED | OUTPATIENT
Start: 2022-06-10 | End: 2022-10-14

## 2022-06-10 RX ORDER — CEFDINIR 300 MG/1
300 CAPSULE ORAL 2 TIMES DAILY
Qty: 14 CAPSULE | Refills: 0 | Status: SHIPPED | OUTPATIENT
Start: 2022-06-10 | End: 2022-10-14

## 2022-06-10 RX ORDER — METHYLPREDNISOLONE 4 MG/1
TABLET ORAL
Qty: 21 TABLET | Refills: 0 | Status: SHIPPED | OUTPATIENT
Start: 2022-06-10 | End: 2022-10-14

## 2022-06-10 NOTE — PROGRESS NOTES
Patient Name: Denise Lambert  : 1980   MRN: [id number]     Chief Complaint:  dysuria    History of Present Illness: Denise Lambert is a 41 y.o. female presents for telehealth.    You have chosen to receive care through a telehealth visit. Patient consents to video/audio connection for your medical care today.     Pt tested positive for COVID on 2022.  She is having maxillary sinus pain, sinus pressure and cough.  She has been taking DayQuil and NyQuil with some relief.  She is having mild shortness of breath with coughing.  She has had fever and chills.     She is also complaining of  urinary frequency, urgency and dysuria.  She is on her period.  She has not had a recent UTI or taken any antibiotics recently. She has been drinking lots of fluids.   She has been taking Azo with some relief.        Subjective     Review of System: Review of Systems   Constitutional: Positive for chills and fever. Negative for fatigue.   HENT: Positive for congestion, rhinorrhea, sinus pressure and sinus pain.    Respiratory: Positive for cough and shortness of breath (Mild).    Gastrointestinal: Negative for abdominal pain, diarrhea, nausea and vomiting.   Endocrine: Negative for polyuria.   Genitourinary: Negative for decreased urine volume, difficulty urinating, dysuria, flank pain, frequency and urgency.   Musculoskeletal: Negative for arthralgias.   Skin: Negative for color change and rash.   Neurological: Negative for weakness and headaches.   Psychiatric/Behavioral: Negative for agitation and behavioral problems.        Medications:     Current Outpatient Medications:   •  cefdinir (OMNICEF) 300 MG capsule, Take 1 capsule by mouth 2 (Two) Times a Day., Disp: 14 capsule, Rfl: 0  •  lamoTRIgine (LaMICtal) 150 MG tablet, , Disp: , Rfl:   •  metFORMIN ER (GLUCOPHAGE-XR) 500 MG 24 hr tablet, , Disp: , Rfl:   •  methylPREDNISolone (MEDROL) 4 MG dose pack, Take as directed on package instructions., Disp: 21 tablet,  Rfl: 0  •  promethazine-dextromethorphan (PROMETHAZINE-DM) 6.25-15 MG/5ML syrup, Take 5 mL by mouth 4 (Four) Times a Day As Needed for Cough., Disp: 60 mL, Rfl: 0  •  VYVANSE 60 MG capsule, , Disp: , Rfl:     Allergies:   Allergies   Allergen Reactions   • Wellbutrin [Bupropion] Anxiety   • Doxycycline Unknown - Low Severity   • Spironolactone GI Intolerance       Objective     Physical Exam:   Vital Signs: There were no vitals filed for this visit.        Physical Exam  Constitutional:       General: She is not in acute distress.     Appearance: She is ill-appearing.   HENT:      Nose: Congestion present.   Pulmonary:      Effort: Pulmonary effort is normal.   Abdominal:      Tenderness: There is no abdominal tenderness.   Lymphadenopathy:      Cervical: No cervical adenopathy.         Assessment / Plan      Assessment/Plan:   Diagnoses and all orders for this visit:    1. Acute cystitis without hematuria (Primary)  -     cefdinir (OMNICEF) 300 MG capsule; Take 1 capsule by mouth 2 (Two) Times a Day.  Dispense: 14 capsule; Refill: 0    2. COVID  -     promethazine-dextromethorphan (PROMETHAZINE-DM) 6.25-15 MG/5ML syrup; Take 5 mL by mouth 4 (Four) Times a Day As Needed for Cough.  Dispense: 60 mL; Refill: 0    3. Acute maxillary sinusitis, recurrence not specified  -     cefdinir (OMNICEF) 300 MG capsule; Take 1 capsule by mouth 2 (Two) Times a Day.  Dispense: 14 capsule; Refill: 0  -     methylPREDNISolone (MEDROL) 4 MG dose pack; Take as directed on package instructions.  Dispense: 21 tablet; Refill: 0       Discussed plan of care.  Patient instructed to isolate per CDC recommendations and patient must be symptom free of fever for 24 hours without the use of tylenol or ibuprofen. Encouraged patient to take medications and monitor symptoms.  Continue supportive care and hydration. Patient to take with a probiotic.  Discussed common side effects of medications.  ER for emergencies or worsening shortness of breath.   Patient verbalized agreement and understanding of plan of care and instructions.   20 minute visit      Follow Up:   Return if symptoms worsen or fail to improve.    RACHELL Elizondo  [facility]

## 2022-10-14 ENCOUNTER — OFFICE VISIT (OUTPATIENT)
Dept: INTERNAL MEDICINE | Facility: CLINIC | Age: 42
End: 2022-10-14

## 2022-10-14 ENCOUNTER — LAB (OUTPATIENT)
Dept: LAB | Facility: HOSPITAL | Age: 42
End: 2022-10-14

## 2022-10-14 VITALS
HEART RATE: 91 BPM | TEMPERATURE: 98.4 F | HEIGHT: 63 IN | DIASTOLIC BLOOD PRESSURE: 78 MMHG | RESPIRATION RATE: 18 BRPM | BODY MASS INDEX: 37.14 KG/M2 | SYSTOLIC BLOOD PRESSURE: 118 MMHG | OXYGEN SATURATION: 98 % | WEIGHT: 209.6 LBS

## 2022-10-14 DIAGNOSIS — F32.A ANXIETY AND DEPRESSION: ICD-10-CM

## 2022-10-14 DIAGNOSIS — E78.49 OTHER HYPERLIPIDEMIA: ICD-10-CM

## 2022-10-14 DIAGNOSIS — H91.90 HEARING LOSS, UNSPECIFIED HEARING LOSS TYPE, UNSPECIFIED LATERALITY: ICD-10-CM

## 2022-10-14 DIAGNOSIS — Z23 NEED FOR TDAP VACCINATION: ICD-10-CM

## 2022-10-14 DIAGNOSIS — F41.9 ANXIETY AND DEPRESSION: ICD-10-CM

## 2022-10-14 DIAGNOSIS — Z12.31 SCREENING MAMMOGRAM FOR BREAST CANCER: ICD-10-CM

## 2022-10-14 DIAGNOSIS — Z13.1 ENCOUNTER FOR SCREENING EXAMINATION FOR IMPAIRED GLUCOSE REGULATION AND DIABETES MELLITUS: ICD-10-CM

## 2022-10-14 DIAGNOSIS — Z00.00 ANNUAL PHYSICAL EXAM: Primary | ICD-10-CM

## 2022-10-14 DIAGNOSIS — E66.01 CLASS 2 SEVERE OBESITY DUE TO EXCESS CALORIES WITH SERIOUS COMORBIDITY AND BODY MASS INDEX (BMI) OF 37.0 TO 37.9 IN ADULT: ICD-10-CM

## 2022-10-14 DIAGNOSIS — Z00.00 ANNUAL PHYSICAL EXAM: ICD-10-CM

## 2022-10-14 DIAGNOSIS — Z13.29 THYROID DISORDER SCREEN: ICD-10-CM

## 2022-10-14 PROCEDURE — 80050 GENERAL HEALTH PANEL: CPT

## 2022-10-14 PROCEDURE — 80061 LIPID PANEL: CPT

## 2022-10-14 PROCEDURE — 90471 IMMUNIZATION ADMIN: CPT | Performed by: NURSE PRACTITIONER

## 2022-10-14 PROCEDURE — 90715 TDAP VACCINE 7 YRS/> IM: CPT | Performed by: NURSE PRACTITIONER

## 2022-10-14 PROCEDURE — 99396 PREV VISIT EST AGE 40-64: CPT | Performed by: NURSE PRACTITIONER

## 2022-10-14 RX ORDER — DEXTROAMPHETAMINE SACCHARATE, AMPHETAMINE ASPARTATE, DEXTROAMPHETAMINE SULFATE AND AMPHETAMINE SULFATE 1.25; 1.25; 1.25; 1.25 MG/1; MG/1; MG/1; MG/1
5 TABLET ORAL AS NEEDED
COMMUNITY
Start: 2022-08-30

## 2022-10-14 NOTE — PATIENT INSTRUCTIONS
Advance Care Planning and Advance Directives     You make decisions on a daily basis - decisions about where you want to live, your career, your home, your life. Perhaps one of the most important decisions you face is your choice for future medical care. Take time to talk with your family and your healthcare team and start planning today.  Advance Care Planning is a process that can help you:  Understand possible future healthcare decisions in light of your own experiences  Reflect on those decision in light of your goals and values  Discuss your decisions with those closest to you and the healthcare professionals that care for you  Make a plan by creating a document that reflects your wishes    Surrogate Decision Maker  In the event of a medical emergency, which has left you unable to communicate or to make your own decisions, you would need someone to make decisions for you.  It is important to discuss your preferences for medical treatment with this person while you are in good health.     Qualities of a surrogate decision maker:  Willing to take on this role and responsibility  Knows what you want for future medical care  Willing to follow your wishes even if they don't agree with them  Able to make difficult medical decisions under stressful circumstances    Advance Directives  These are legal documents you can create that will guide your healthcare team and decision maker(s) when needed. These documents can be stored in the electronic medical record.    Living Will - a legal document to guide your care if you have a terminal condition or a serious illness and are unable to communicate. States vary by statute in document names/types, but most forms may include one or more of the following:        -  Directions regarding life-prolonging treatments        -  Directions regarding artificially provided nutrition/hydration        -  Choosing a healthcare decision maker        -  Direction regarding organ/tissue  donation    Durable Power of  for Healthcare - this document names an -in-fact to make medical decisions for you, but it may also allow this person to make personal and financial decisions for you. Please seek the advice of an  if you need this type of document.    **Advance Directives are not required and no one may discriminate against you if you do not sign one.    Medical Orders  Many states allow specific forms/orders signed by your physician to record your wishes for medical treatment in your current state of health. This form, signed in personal communication with your physician, addresses resuscitation and other medical interventions that you may or may not want.      For more information or to schedule a time with a [facility] Advance Care Planning Facilitator contact: [facility] or call [phone] and someone will contact you directly.MyPlate from USDA  MyPlate is an outline of a general healthy diet based on the Dietary Guidelines for Americans, 0274-1633, from the U.S. Department of Agriculture (USDA). It sets guidelines for how much food you should eat from each food group based on your age, sex, and level of physical activity.  What are tips for following MyPlate?  To follow MyPlate recommendations:  Eat a wide variety of fruits and vegetables, grains, and protein foods.  Serve smaller portions and eat less food throughout the day.  Limit portion sizes to avoid overeating.  Enjoy your food.  Get at least 150 minutes of exercise every week. This is about 30 minutes each day, 5 or more days per week.  It can be difficult to have every meal look like MyPlate. Think about MyPlate as eating guidelines for an entire day, rather than each individual meal.  Fruits and vegetables  Make one half of your plate fruits and vegetables.  Eat many different colors of fruits and vegetables each day.  For a 2,000-calorie daily food plan, eat:  2½ cups of vegetables every day.  2  cups of fruit every day.  1 cup is equal to:  1 cup raw or cooked vegetables.  1 cup raw fruit.  1 medium-sized orange, apple, or banana.  1 cup 100% fruit or vegetable juice.  2 cups raw leafy greens, such as lettuce, spinach, or kale.  ½ cup dried fruit.  Grains  One fourth of your plate should be grains.  Make at least half of the grains you eat each day whole grains.  For a 2,000-calorie daily food plan, eat 6 oz of grains every day.  1 oz is equal to:  1 slice bread.  1 cup cereal.  ½ cup cooked rice, cereal, or pasta.  Protein  One fourth of your plate should be protein.  Eat a wide variety of protein foods, including meat, poultry, fish, eggs, beans, nuts, and tofu.  For a 2,000-calorie daily food plan, eat 5½ oz of protein every day.  1 oz is equal to:  1 oz meat, poultry, or fish.  ¼ cup cooked beans.  1 egg.  ½ oz nuts or seeds.  1 Tbsp peanut butter.  Dairy  Drink fat-free or low-fat (1%) milk.  Eat or drink dairy as a side to meals.  For a 2,000-calorie daily food plan, eat or drink 3 cups of dairy every day.  1 cup is equal to:  1 cup milk, yogurt, cottage cheese, or soy milk (soy beverage).  2 oz processed cheese.  1½ oz natural cheese.  Fats, oils, salt, and sugars  Only small amounts of oils are recommended.  Avoid foods that are high in calories and low in nutritional value (empty calories), like foods high in fat or added sugars.  Choose foods that are low in salt (sodium). Choose foods that have less than 140 milligrams (mg) of sodium per serving.  Drink water instead of sugary drinks. Drink enough fluid to keep your urine pale yellow.  Where to find support  Work with your health care provider or a dietitian to develop a customized eating plan that is right for you.  Download an suman (mobile application) to help you track your daily food intake.  Where to find more information  USDA: ChooseMyPlate.gov  Summary  MyPlate is a general guideline for healthy eating from the USDA. It is based on the  Dietary Guidelines for Americans, 0927-2385.  In general, fruits and vegetables should take up one half of your plate, grains should take up one fourth of your plate, and protein should take up one fourth of your plate.  This information is not intended to replace advice given to you by your health care provider. Make sure you discuss any questions you have with your health care provider.  Document Revised: 11/08/2021 Document Reviewed: 11/08/2021  Elsevier Patient Education © 2022 [employer]

## 2022-10-14 NOTE — PROGRESS NOTES
"Chief Complaint  Annual Exam (fasting)    Subjective          Denise Lambert presents to [facility]  History of Present Illness     Ms. Lambert is currently taking metformin 500 mg for her Polycystic ovary syndrome and Lamictal and Adderall for her anxiety and depression. Her cholesterol has been stable with diet control.  Her diet has not been very good at that time.    The patient has been having pain in the back of her left heel. She states a long time ago she rolled this same ankle.    Ms. Lambert is concerned about her hearing, she explains sometimes when people talk, she can not hear them.    She still has her menstrual cycle and states that after the COVID-19 vaccine, it became irregular.    The patient states she received her influenza vaccine earlier today. She would like the TDAP vaccine and would like to wait for the COVID-19 booster.    Her blood pressure is in normal range today. She states she has seen a dentist and eye specialist in the last year. She does not have a living will. She is due for a mammogram. She denies any family history of colon cancer.    No headache dizziness visual changes chest pain shortness of breath swelling abdominal pain no changes in bowel bladder habits no lumps no bumps no rashes    Current Outpatient Medications:   •  amphetamine-dextroamphetamine (ADDERALL) 5 MG tablet, 1 tablet As Needed., Disp: , Rfl:   •  lamoTRIgine (LaMICtal) 150 MG tablet, , Disp: , Rfl:   •  metFORMIN ER (GLUCOPHAGE-XR) 500 MG 24 hr tablet, 2 tablets Every Night., Disp: , Rfl:   •  VYVANSE 60 MG capsule, , Disp: , Rfl:          Objective   Vital Signs:   /78   Pulse 91   Temp 98.4 °F (36.9 °C)   Resp 18   Ht 160 cm (63\")   Wt 95.1 kg (209 lb 9.6 oz)   SpO2 98%   BMI 37.13 kg/m²     Physical Exam  Vitals and nursing note reviewed.   Constitutional:       Appearance: She is well-developed.   HENT:      Head: Normocephalic and atraumatic.    "   Right Ear: External ear normal.      Left Ear: External ear normal.      Nose: Nose normal.   Eyes:      General: No scleral icterus.        Right eye: No discharge.         Left eye: No discharge.      Conjunctiva/sclera: Conjunctivae normal.      Pupils: Pupils are equal, round, and reactive to light.   Neck:      Thyroid: No thyromegaly.      Vascular: No carotid bruit.   Cardiovascular:      Rate and Rhythm: Normal rate and regular rhythm.      Heart sounds: Normal heart sounds. No murmur heard.    No friction rub. No gallop.   Pulmonary:      Effort: Pulmonary effort is normal.      Breath sounds: Normal breath sounds.   Chest:   Breasts:     Right: Normal.      Left: Normal.   Abdominal:      General: Bowel sounds are normal. There is no distension.      Palpations: Abdomen is soft. There is no mass.      Tenderness: There is no abdominal tenderness. There is no guarding or rebound.      Hernia: No hernia is present.   Musculoskeletal:         General: Normal range of motion.      Cervical back: Normal range of motion.   Lymphadenopathy:      Head:      Right side of head: No submental, submandibular, tonsillar, preauricular, posterior auricular or occipital adenopathy.      Left side of head: No submental, submandibular, tonsillar, preauricular, posterior auricular or occipital adenopathy.      Cervical: No cervical adenopathy.      Right cervical: No superficial, deep or posterior cervical adenopathy.     Left cervical: No superficial, deep or posterior cervical adenopathy.      Upper Body:      Right upper body: No supraclavicular, axillary, pectoral or epitrochlear adenopathy.      Left upper body: No supraclavicular, axillary, pectoral or epitrochlear adenopathy.      Lower Body: No right inguinal adenopathy. No left inguinal adenopathy.   Skin:     General: Skin is warm and dry.      Capillary Refill: Capillary refill takes 2 to 3 seconds.   Neurological:      Mental Status: She is alert and oriented  to person, place, and time.      Deep Tendon Reflexes: Reflexes normal.   Psychiatric:         Behavior: Behavior normal.         Thought Content: Thought content normal.         Judgment: Judgment normal.        Result Review :                 Assessment and Plan    Diagnoses and all orders for this visit:    1. Annual physical exam (Primary)  -     CBC & Differential; Future    2. Class 2 severe obesity due to excess calories with serious comorbidity and body mass index (BMI) of 37.0 to 37.9 in adult (HCC)  -     CBC & Differential; Future    3. Anxiety and depression  -     CBC & Differential; Future    4. Other hyperlipidemia  -     CBC & Differential; Future  -     Lipid Panel; Future    5. Need for Tdap vaccination  -     CBC & Differential; Future  -     Tdap Vaccine Greater Than or Equal To 8yo IM    6. Thyroid disorder screen  -     CBC & Differential; Future  -     TSH; Future    7. Encounter for screening examination for impaired glucose regulation and diabetes mellitus  -     CBC & Differential; Future  -     Comprehensive Metabolic Panel; Future  -     POC Urinalysis Dipstick, Automated; Future    8. Screening mammogram for breast cancer  -     Mammo Screening Digital Tomosynthesis Bilateral With CAD; Future  -     CBC & Differential; Future    9. Hearing loss, unspecified hearing loss type, unspecified laterality  -     Screening Test Pure Tone, Air Only; Future    Hyperlipidemia  - The patient will continue following a healthy diet and exercising.    PCOS  - The patient will continue her medication.    Anxiety and depression  - The patient will continue her medication    Heel spur  - I advised the patient to try topical Voltaren gel and to pay attention to the shoes she is using.    Immunization  - The patient will receive the TDAP vaccine.    Breast cancer screening  - I will order a screening mammogram.           Patient education regarding the importance of avoidance of texting while driving and  the need for wearing seatbelt.  Use of sunscreen, use of helmets while on bikes.  The appropriate amounts of sleep and H20 consumption per day was reviewed with pt.  The need for healthy well-balanced diet based off the food guide pyramid and avoidance of skipping meals along with following a NCS diet with appropriate amounts of fats, protein, and carbohydrate and low sodium diet. Encouraging 30minutes of cardio 5d weekly and muscle strengthening 3x weekly.     AWV: na  A1C: No results found for: HGBA1C   ACP: Advance Care Planning   ACP discussion was held with the patient during this visit. Patient does not have an advance directive, information provided.   Mammogram: scheduled Na  “Discussed risks/benefits to vaccination, reviewed components of the vaccine, discussed VIS, discussed informed consent, informed consent obtained. Patient/Parent was allowed to accept or refuse vaccine. Questions answered to satisfactory state of patient/Parent. We reviewed typical age appropriate and seasonally appropriate vaccinations. Reviewed immunization history and updated state vaccination form as needed. Patient was counseled on Tdap  Colonoscopy: na    Follow Up   Return in about 1 year (around 10/14/2023) for fasting, Annual.  Patient was given instructions and counseling regarding her condition or for health maintenance advice. Please see specific information pulled into the AVS if appropriate.     RTC/call  If symptoms worsen  Meds MOA and SE's reviewed and pt v/u    RACHELL Boles [facility]  [address] [address]  Fax [phone]  Phone [phone]    Transcribed from ambient dictation for RACHELL Boles by Esperanza Mark.  10/14/22   18:54 EDT    Patient or patient representative verbalized consent to the visit recording.  I have personally performed the services described in this document as  transcribed by the above individual, and it is both accurate and complete.

## 2022-10-15 LAB
ALBUMIN SERPL-MCNC: 4.8 G/DL (ref 3.5–5.2)
ALBUMIN/GLOB SERPL: 1.7 G/DL
ALP SERPL-CCNC: 55 U/L (ref 39–117)
ALT SERPL W P-5'-P-CCNC: 8 U/L (ref 1–33)
ANION GAP SERPL CALCULATED.3IONS-SCNC: 12 MMOL/L (ref 5–15)
AST SERPL-CCNC: 14 U/L (ref 1–32)
BASOPHILS # BLD AUTO: 0.03 10*3/MM3 (ref 0–0.2)
BASOPHILS NFR BLD AUTO: 0.4 % (ref 0–1.5)
BILIRUB SERPL-MCNC: 0.6 MG/DL (ref 0–1.2)
BUN SERPL-MCNC: 10 MG/DL (ref 6–20)
BUN/CREAT SERPL: 14.5 (ref 7–25)
CALCIUM SPEC-SCNC: 9.7 MG/DL (ref 8.6–10.5)
CHLORIDE SERPL-SCNC: 100 MMOL/L (ref 98–107)
CHOLEST SERPL-MCNC: 287 MG/DL (ref 0–200)
CO2 SERPL-SCNC: 23 MMOL/L (ref 22–29)
CREAT SERPL-MCNC: 0.69 MG/DL (ref 0.57–1)
DEPRECATED RDW RBC AUTO: 37 FL (ref 37–54)
EGFRCR SERPLBLD CKD-EPI 2021: 112.7 ML/MIN/1.73
EOSINOPHIL # BLD AUTO: 0.08 10*3/MM3 (ref 0–0.4)
EOSINOPHIL NFR BLD AUTO: 0.9 % (ref 0.3–6.2)
ERYTHROCYTE [DISTWIDTH] IN BLOOD BY AUTOMATED COUNT: 12.4 % (ref 12.3–15.4)
GLOBULIN UR ELPH-MCNC: 2.9 GM/DL
GLUCOSE SERPL-MCNC: 81 MG/DL (ref 65–99)
HCT VFR BLD AUTO: 39.9 % (ref 34–46.6)
HDLC SERPL-MCNC: 51 MG/DL (ref 40–60)
HGB BLD-MCNC: 13.9 G/DL (ref 12–15.9)
IMM GRANULOCYTES # BLD AUTO: 0.02 10*3/MM3 (ref 0–0.05)
IMM GRANULOCYTES NFR BLD AUTO: 0.2 % (ref 0–0.5)
LDLC SERPL CALC-MCNC: 207 MG/DL (ref 0–100)
LDLC/HDLC SERPL: 4.02 {RATIO}
LYMPHOCYTES # BLD AUTO: 2.35 10*3/MM3 (ref 0.7–3.1)
LYMPHOCYTES NFR BLD AUTO: 27.5 % (ref 19.6–45.3)
MCH RBC QN AUTO: 28.7 PG (ref 26.6–33)
MCHC RBC AUTO-ENTMCNC: 34.8 G/DL (ref 31.5–35.7)
MCV RBC AUTO: 82.4 FL (ref 79–97)
MONOCYTES # BLD AUTO: 0.49 10*3/MM3 (ref 0.1–0.9)
MONOCYTES NFR BLD AUTO: 5.7 % (ref 5–12)
NEUTROPHILS NFR BLD AUTO: 5.58 10*3/MM3 (ref 1.7–7)
NEUTROPHILS NFR BLD AUTO: 65.3 % (ref 42.7–76)
NRBC BLD AUTO-RTO: 0 /100 WBC (ref 0–0.2)
PLATELET # BLD AUTO: 282 10*3/MM3 (ref 140–450)
PMV BLD AUTO: 9 FL (ref 6–12)
POTASSIUM SERPL-SCNC: 4 MMOL/L (ref 3.5–5.2)
PROT SERPL-MCNC: 7.7 G/DL (ref 6–8.5)
RBC # BLD AUTO: 4.84 10*6/MM3 (ref 3.77–5.28)
SODIUM SERPL-SCNC: 135 MMOL/L (ref 136–145)
TRIGL SERPL-MCNC: 155 MG/DL (ref 0–150)
TSH SERPL DL<=0.05 MIU/L-ACNC: 3.24 UIU/ML (ref 0.27–4.2)
VLDLC SERPL-MCNC: 29 MG/DL (ref 5–40)
WBC NRBC COR # BLD: 8.55 10*3/MM3 (ref 3.4–10.8)

## 2022-11-07 ENCOUNTER — APPOINTMENT (OUTPATIENT)
Dept: MAMMOGRAPHY | Facility: HOSPITAL | Age: 42
End: 2022-11-07

## 2022-12-07 ENCOUNTER — HOSPITAL ENCOUNTER (OUTPATIENT)
Dept: MAMMOGRAPHY | Facility: HOSPITAL | Age: 42
Discharge: HOME | End: 2022-12-07
Admitting: NURSE PRACTITIONER

## 2022-12-07 DIAGNOSIS — Z12.31 SCREENING MAMMOGRAM FOR BREAST CANCER: ICD-10-CM

## 2022-12-07 PROCEDURE — 77063 BREAST TOMOSYNTHESIS BI: CPT | Performed by: RADIOLOGY

## 2022-12-07 PROCEDURE — 77067 SCR MAMMO BI INCL CAD: CPT | Performed by: RADIOLOGY

## 2022-12-07 PROCEDURE — 77063 BREAST TOMOSYNTHESIS BI: CPT

## 2022-12-07 PROCEDURE — 77067 SCR MAMMO BI INCL CAD: CPT

## 2022-12-19 ENCOUNTER — HOSPITAL ENCOUNTER (OUTPATIENT)
Dept: ULTRASOUND IMAGING | Facility: HOSPITAL | Age: 42
Discharge: HOME | End: 2022-12-19

## 2022-12-19 ENCOUNTER — HOSPITAL ENCOUNTER (OUTPATIENT)
Dept: MAMMOGRAPHY | Facility: HOSPITAL | Age: 42
Discharge: HOME | End: 2022-12-19

## 2022-12-19 DIAGNOSIS — R92.8 ABNORMAL MAMMOGRAM: ICD-10-CM

## 2022-12-19 PROCEDURE — 76642 ULTRASOUND BREAST LIMITED: CPT

## 2022-12-19 PROCEDURE — 76642 ULTRASOUND BREAST LIMITED: CPT | Performed by: RADIOLOGY

## 2022-12-19 PROCEDURE — 77065 DX MAMMO INCL CAD UNI: CPT | Performed by: RADIOLOGY

## 2022-12-19 PROCEDURE — G0279 TOMOSYNTHESIS, MAMMO: HCPCS

## 2022-12-19 PROCEDURE — 77061 BREAST TOMOSYNTHESIS UNI: CPT | Performed by: RADIOLOGY

## 2022-12-19 PROCEDURE — 77065 DX MAMMO INCL CAD UNI: CPT

## 2023-11-22 ENCOUNTER — OFFICE VISIT (OUTPATIENT)
Dept: INTERNAL MEDICINE | Facility: CLINIC | Age: 43
End: 2023-11-22
Payer: COMMERCIAL

## 2023-11-22 VITALS
WEIGHT: 207 LBS | DIASTOLIC BLOOD PRESSURE: 80 MMHG | TEMPERATURE: 97.3 F | HEART RATE: 76 BPM | RESPIRATION RATE: 18 BRPM | SYSTOLIC BLOOD PRESSURE: 122 MMHG | BODY MASS INDEX: 36.68 KG/M2 | HEIGHT: 63 IN

## 2023-11-22 DIAGNOSIS — R51.9 HEADACHE, WORSENING: ICD-10-CM

## 2023-11-22 DIAGNOSIS — R03.0 ELEVATED BLOOD PRESSURE READING: ICD-10-CM

## 2023-11-22 DIAGNOSIS — E78.49 OTHER HYPERLIPIDEMIA: ICD-10-CM

## 2023-11-22 DIAGNOSIS — R07.9 CHEST PAIN, UNSPECIFIED TYPE: Primary | ICD-10-CM

## 2023-11-22 DIAGNOSIS — R31.9 HEMATURIA, UNSPECIFIED TYPE: ICD-10-CM

## 2023-11-22 DIAGNOSIS — Z13.1 ENCOUNTER FOR SCREENING EXAMINATION FOR IMPAIRED GLUCOSE REGULATION AND DIABETES MELLITUS: ICD-10-CM

## 2023-11-22 DIAGNOSIS — Z13.29 THYROID DISORDER SCREEN: ICD-10-CM

## 2023-11-22 DIAGNOSIS — E55.9 VITAMIN D DEFICIENCY: ICD-10-CM

## 2023-11-22 LAB
25(OH)D3 SERPL-MCNC: 19.2 NG/ML (ref 30–100)
ALBUMIN SERPL-MCNC: 4.7 G/DL (ref 3.5–5.2)
ALBUMIN/GLOB SERPL: 1.8 G/DL
ALP SERPL-CCNC: 61 U/L (ref 39–117)
ALT SERPL W P-5'-P-CCNC: 14 U/L (ref 1–33)
ANION GAP SERPL CALCULATED.3IONS-SCNC: 11.6 MMOL/L (ref 5–15)
AST SERPL-CCNC: 15 U/L (ref 1–32)
BACTERIA UR QL AUTO: ABNORMAL /HPF
BASOPHILS # BLD AUTO: 0.01 10*3/MM3 (ref 0–0.2)
BASOPHILS NFR BLD AUTO: 0.2 % (ref 0–1.5)
BILIRUB BLD-MCNC: NEGATIVE MG/DL
BILIRUB SERPL-MCNC: 0.5 MG/DL (ref 0–1.2)
BUN SERPL-MCNC: 12 MG/DL (ref 6–20)
BUN/CREAT SERPL: 17.6 (ref 7–25)
CALCIUM SPEC-SCNC: 9.4 MG/DL (ref 8.6–10.5)
CHLORIDE SERPL-SCNC: 104 MMOL/L (ref 98–107)
CHOLEST SERPL-MCNC: 271 MG/DL (ref 0–200)
CLARITY, POC: ABNORMAL
CO2 SERPL-SCNC: 25.4 MMOL/L (ref 22–29)
COLOR UR: YELLOW
CREAT SERPL-MCNC: 0.68 MG/DL (ref 0.57–1)
D DIMER PPP FEU-MCNC: <0.27 MCGFEU/ML (ref 0–0.5)
DEPRECATED RDW RBC AUTO: 37.7 FL (ref 37–54)
EGFRCR SERPLBLD CKD-EPI 2021: 112.4 ML/MIN/1.73
EOSINOPHIL # BLD AUTO: 0.08 10*3/MM3 (ref 0–0.4)
EOSINOPHIL NFR BLD AUTO: 1.5 % (ref 0.3–6.2)
ERYTHROCYTE [DISTWIDTH] IN BLOOD BY AUTOMATED COUNT: 12.7 % (ref 12.3–15.4)
EXPIRATION DATE: ABNORMAL
GLOBULIN UR ELPH-MCNC: 2.6 GM/DL
GLUCOSE SERPL-MCNC: 88 MG/DL (ref 65–99)
GLUCOSE UR STRIP-MCNC: NEGATIVE MG/DL
HCT VFR BLD AUTO: 40.1 % (ref 34–46.6)
HDLC SERPL-MCNC: 47 MG/DL (ref 40–60)
HGB BLD-MCNC: 13.7 G/DL (ref 12–15.9)
HYALINE CASTS UR QL AUTO: ABNORMAL /LPF
IMM GRANULOCYTES # BLD AUTO: 0 10*3/MM3 (ref 0–0.05)
IMM GRANULOCYTES NFR BLD AUTO: 0 % (ref 0–0.5)
KETONES UR QL: NEGATIVE
LDLC SERPL CALC-MCNC: 189 MG/DL (ref 0–100)
LDLC/HDLC SERPL: 3.97 {RATIO}
LEUKOCYTE EST, POC: NEGATIVE
LYMPHOCYTES # BLD AUTO: 1.82 10*3/MM3 (ref 0.7–3.1)
LYMPHOCYTES NFR BLD AUTO: 34.3 % (ref 19.6–45.3)
Lab: ABNORMAL
MCH RBC QN AUTO: 28.3 PG (ref 26.6–33)
MCHC RBC AUTO-ENTMCNC: 34.2 G/DL (ref 31.5–35.7)
MCV RBC AUTO: 82.9 FL (ref 79–97)
MONOCYTES # BLD AUTO: 0.32 10*3/MM3 (ref 0.1–0.9)
MONOCYTES NFR BLD AUTO: 6 % (ref 5–12)
NEUTROPHILS NFR BLD AUTO: 3.07 10*3/MM3 (ref 1.7–7)
NEUTROPHILS NFR BLD AUTO: 58 % (ref 42.7–76)
NITRITE UR-MCNC: NEGATIVE MG/ML
NRBC BLD AUTO-RTO: 0.2 /100 WBC (ref 0–0.2)
PH UR: 5 [PH] (ref 5–8)
PLATELET # BLD AUTO: 278 10*3/MM3 (ref 140–450)
PMV BLD AUTO: 8.8 FL (ref 6–12)
POTASSIUM SERPL-SCNC: 3.9 MMOL/L (ref 3.5–5.2)
PROT SERPL-MCNC: 7.3 G/DL (ref 6–8.5)
PROT UR STRIP-MCNC: NEGATIVE MG/DL
RBC # BLD AUTO: 4.84 10*6/MM3 (ref 3.77–5.28)
RBC # UR STRIP: ABNORMAL /HPF
RBC # UR STRIP: ABNORMAL /UL
REF LAB TEST METHOD: ABNORMAL
SODIUM SERPL-SCNC: 141 MMOL/L (ref 136–145)
SP GR UR: 1.02 (ref 1–1.03)
SQUAMOUS #/AREA URNS HPF: ABNORMAL /HPF
TRIGL SERPL-MCNC: 187 MG/DL (ref 0–150)
TROPONIN T SERPL HS-MCNC: <6 NG/L
TSH SERPL DL<=0.05 MIU/L-ACNC: 2.97 UIU/ML (ref 0.27–4.2)
UROBILINOGEN UR QL: NORMAL
VLDLC SERPL-MCNC: 35 MG/DL (ref 5–40)
WBC # UR STRIP: ABNORMAL /HPF
WBC NRBC COR # BLD AUTO: 5.3 10*3/MM3 (ref 3.4–10.8)

## 2023-11-22 PROCEDURE — 85379 FIBRIN DEGRADATION QUANT: CPT | Performed by: NURSE PRACTITIONER

## 2023-11-22 PROCEDURE — 81015 MICROSCOPIC EXAM OF URINE: CPT | Performed by: NURSE PRACTITIONER

## 2023-11-22 PROCEDURE — 80050 GENERAL HEALTH PANEL: CPT | Performed by: NURSE PRACTITIONER

## 2023-11-22 PROCEDURE — 80061 LIPID PANEL: CPT | Performed by: NURSE PRACTITIONER

## 2023-11-22 PROCEDURE — 82306 VITAMIN D 25 HYDROXY: CPT | Performed by: NURSE PRACTITIONER

## 2023-11-22 PROCEDURE — 84484 ASSAY OF TROPONIN QUANT: CPT | Performed by: NURSE PRACTITIONER

## 2023-11-22 RX ORDER — TRIAMTERENE AND HYDROCHLOROTHIAZIDE 37.5; 25 MG/1; MG/1
1 CAPSULE ORAL EVERY MORNING
Qty: 30 CAPSULE | Refills: 0 | Status: SHIPPED | OUTPATIENT
Start: 2023-11-22

## 2023-11-22 NOTE — PROGRESS NOTES
"Chief Complaint  Chest Pain (Pt had chest pain Saturday. ) and Headache (On and off. )    Subjective          Denise Lambert presents to [facility]  History of Present Illness  The patient presents today for evaluation of headaches.    Headaches  The patient reports that she has been experiencing intermittent headaches for approximately 1 year. She describes the headaches as \"pounding\" and \"almost like a migraine.\" She denies any visual changes, dizziness, or feeling like she is going to pass out. She rates her headache as a 10 out of 10 when it occurs. This is the worst pain she has ever had. She denies any fevers, nausea, vomiting, or diarrhea.    Chest pain  The patient reports that 2 weeks ago, she was driving to work and experienced central chest pain that radiated out. Prior to the chest pain, she was working around the house doing some regular housework, not anything strenuous. She felt really flushed when it happened. She has had panic attacks in the past. She is under a lot of stress.    Hypertension  Her blood pressure looks good today. The night of the chest pain, her blood pressure was 162/92 mmHg.    Hyperlipidemia  She is not currently taking any medication for her cholesterol. She was sent a message about going on a statin. She is willing to start it, but she does not want to start a statin after she has a stroke or a heart attack.    Anxiety  She is taking BuSpar. She is tolerating it well. She has been taking 10 mg twice a day every day for a month.    Family history  Her mother has a strong family history of heart disease, congestive heart failure, and diabetes. Her father has an aneurysm. Her paternal aunt  of the same thing.     PCOS  She is no longer taking metformin.     Vitamin D deficiency  She has had low vitamin D in the past.    Health maintenance  She is due for a mammogram. She has not had any blood work in over a year.       Current " "Outpatient Medications:     busPIRone (BUSPAR) 2.5 MG half tablet, Take 2 half tablet by mouth As Needed., Disp: , Rfl:     lamoTRIgine (LaMICtal) 150 MG tablet, , Disp: , Rfl:     VYVANSE 60 MG capsule, , Disp: , Rfl:     triamterene-hydrochlorothiazide (Dyazide) 37.5-25 MG per capsule, Take 1 capsule by mouth Every Morning., Disp: 30 capsule, Rfl: 0         Objective   Vital Signs:   /80 (BP Location: Right arm, Patient Position: Sitting, Cuff Size: Adult)   Pulse 76   Temp 97.3 °F (36.3 °C) (Infrared)   Resp 18   Ht 160 cm (63\")   Wt 93.9 kg (207 lb)   BMI 36.67 kg/m²     Physical Exam  Vitals and nursing note reviewed.   Constitutional:       General: She is not in acute distress.     Appearance: Normal appearance. She is well-developed. She is not ill-appearing.   HENT:      Head: Normocephalic and atraumatic.   Eyes:      General: No scleral icterus.  Neck:      Thyroid: No thyromegaly.   Cardiovascular:      Rate and Rhythm: Normal rate and regular rhythm.   Pulmonary:      Effort: Pulmonary effort is normal.      Breath sounds: Normal breath sounds.   Abdominal:      General: Bowel sounds are normal. There is no distension.      Palpations: Abdomen is soft.      Tenderness: There is no abdominal tenderness.   Lymphadenopathy:      Cervical: No cervical adenopathy.   Skin:     Capillary Refill: Capillary refill takes 2 to 3 seconds.      Coloration: Skin is not pale.   Neurological:      Mental Status: She is alert and oriented to person, place, and time.   Psychiatric:         Mood and Affect: Mood normal.         Behavior: Behavior normal.        Result Review :                 Assessment and Plan    Diagnoses and all orders for this visit:    1. Chest pain, unspecified type (Primary)  -     CT Head With & Without Contrast; Future  -     Stress test; Future  -     CBC & Differential; Future    2. Elevated blood pressure reading  -     CBC & Differential; Future    3. Headache, worsening  -     " CT Head With & Without Contrast; Future  -     CBC & Differential; Future    4. Thyroid disorder screen  -     CBC & Differential; Future  -     TSH; Future    5. Encounter for screening examination for impaired glucose regulation and diabetes mellitus  -     CBC & Differential; Future  -     Comprehensive Metabolic Panel; Future  -     POC Urinalysis Dipstick, Automated; Future    6. Other hyperlipidemia  -     CBC & Differential; Future  -     Lipid Panel; Future    7. Vitamin D deficiency  -     Vitamin D,25-Hydroxy; Future    Other orders  -     triamterene-hydrochlorothiazide (Dyazide) 37.5-25 MG per capsule; Take 1 capsule by mouth Every Morning.  Dispense: 30 capsule; Refill: 0    Headaches  - We will order a CT scan of the head.  - If she develops any symptoms with headache, even if it is not the worst headache, if it comes back, she has visual changes, dizziness, feel like she is going to pass out, sick to her stomach, please just go on in take a look at her at the hospital.  - I would not say treat any different than Tylenol or Motrin, make sure she is well hydrated. Dehydration does impact headaches, a lot muscle tension definitely can be a piece of this and if she is stressed, it is amazing, what myofacial pain can occur from stress.    Hypertension  - I want her to monitor her blood pressure. If she finds her blood pressure is above 120/70 at any point, that is high blood pressure.  - I am going to prescribe her Dyazide.  - If her blood pressure is not in normal range in the next 48 to 72 hours, start the triamterene hydrochlorothiazide or thiazide.    Chest pain  - She has a nonspecific T wave, but it would be enough to buy a stress test given her cholesterol, family history, and not on medicine.  - In the meantime, if she has that again, take 3 baby aspirins, chew them up and go to the hospital.    Anxiety  - I will refill her BuSpar.    Health maintenance  - I will order blood work.  - I will order a  mammogram.      Follow Up   Return in about 2 weeks (around 12/6/2023) for Annual, Recheck.  Patient was given instructions and counseling regarding her condition or for health maintenance advice. Please see specific information pulled into the AVS if appropriate.     RTC/call  If symptoms worsen  Meds MOA and SE's reviewed and pt v/u    RACHELL Boles [facility]  [address] [address]  Fax [phone]  Phone [phone]    Transcribed from ambient dictation for RACHELL Boles by Nico Godinez.  11/22/23   11:20 EST    Patient or patient representative verbalized consent to the visit recording.  I have personally performed the services described in this document as transcribed by the above individual, and it is both accurate and complete.

## 2023-11-22 NOTE — PATIENT INSTRUCTIONS
"DASH Eating Plan  DASH stands for Dietary Approaches to Stop Hypertension. The DASH eating plan is a healthy eating plan that has been shown to:  Reduce high blood pressure (hypertension).  Reduce your risk for type 2 diabetes, heart disease, and stroke.  Help with weight loss.  What are tips for following this plan?  Reading food labels  Check food labels for the amount of salt (sodium) per serving. Choose foods with less than 5 percent of the Daily Value of sodium. Generally, foods with less than 300 milligrams (mg) of sodium per serving fit into this eating plan.  To find whole grains, look for the word \"whole\" as the first word in the ingredient list.  Shopping  Buy products labeled as \"low-sodium\" or \"no salt added.\"  Buy fresh foods. Avoid canned foods and pre-made or frozen meals.  Cooking  Avoid adding salt when cooking. Use salt-free seasonings or herbs instead of table salt or sea salt. Check with your health care provider or pharmacist before using salt substitutes.  Do not pérez foods. Cook foods using healthy methods such as baking, boiling, grilling, roasting, and broiling instead.  Cook with heart-healthy oils, such as olive, canola, avocado, soybean, or sunflower oil.  Meal planning    Eat a balanced diet that includes:  4 or more servings of fruits and 4 or more servings of vegetables each day. Try to fill one-half of your plate with fruits and vegetables.  6-8 servings of whole grains each day.  Less than 6 oz (170 g) of lean meat, poultry, or fish each day. A 3-oz (85-g) serving of meat is about the same size as a deck of cards. One egg equals 1 oz (28 g).  2-3 servings of low-fat dairy each day. One serving is 1 cup (237 mL).  1 serving of nuts, seeds, or beans 5 times each week.  2-3 servings of heart-healthy fats. Healthy fats called omega-3 fatty acids are found in foods such as walnuts, flaxseeds, fortified milks, and eggs. These fats are also found in cold-water fish, such as sardines, salmon, " and mackerel.  Limit how much you eat of:  Canned or prepackaged foods.  Food that is high in trans fat, such as some fried foods.  Food that is high in saturated fat, such as fatty meat.  Desserts and other sweets, sugary drinks, and other foods with added sugar.  Full-fat dairy products.  Do not salt foods before eating.  Do not eat more than 4 egg yolks a week.  Try to eat at least 2 vegetarian meals a week.  Eat more home-cooked food and less restaurant, buffet, and fast food.  Lifestyle  When eating at a restaurant, ask that your food be prepared with less salt or no salt, if possible.  If you drink alcohol:  Limit how much you use to:  0-1 drink a day for women who are not pregnant.  0-2 drinks a day for men.  Be aware of how much alcohol is in your drink. In the [place] one drink equals one 12 oz bottle of beer (355 mL), one 5 oz glass of wine (148 mL), or one 1½ oz glass of hard liquor (44 mL).  General information  Avoid eating more than 2,300 mg of salt a day. If you have hypertension, you may need to reduce your sodium intake to 1,500 mg a day.  Work with your health care provider to maintain a healthy body weight or to lose weight. Ask what an ideal weight is for you.  Get at least 30 minutes of exercise that causes your heart to beat faster (aerobic exercise) most days of the week. Activities may include walking, swimming, or biking.  Work with your health care provider or dietitian to adjust your eating plan to your individual calorie needs.  What foods should I eat?  Fruits  All fresh, dried, or frozen fruit. Canned fruit in natural juice (without added sugar).  Vegetables  Fresh or frozen vegetables (raw, steamed, roasted, or grilled). Low-sodium or reduced-sodium tomato and vegetable juice. Low-sodium or reduced-sodium tomato sauce and tomato paste. Low-sodium or reduced-sodium canned vegetables.  Grains  Whole-grain or whole-wheat bread. Whole-grain or whole-wheat pasta. Brown rice. Oatmeal. Quinoa.  Bulgur. Whole-grain and low-sodium cereals. Nicci bread. Low-fat, low-sodium crackers. Whole-wheat flour tortillas.  Meats and other proteins  Skinless chicken or turkey. Ground chicken or turkey. Pork with fat trimmed off. Fish and seafood. Egg whites. Dried beans, peas, or lentils. Unsalted nuts, nut butters, and seeds. Unsalted canned beans. Lean cuts of beef with fat trimmed off. Low-sodium, lean precooked or cured meat, such as sausages or meat loaves.  Dairy  Low-fat (1%) or fat-free (skim) milk. Reduced-fat, low-fat, or fat-free cheeses. Nonfat, low-sodium ricotta or cottage cheese. Low-fat or nonfat yogurt. Low-fat, low-sodium cheese.  Fats and oils  Soft margarine without trans fats. Vegetable oil. Reduced-fat, low-fat, or light mayonnaise and salad dressings (reduced-sodium). Canola, safflower, olive, avocado, soybean, and sunflower oils. Avocado.  Seasonings and condiments  Herbs. Spices. Seasoning mixes without salt.  Other foods  Unsalted popcorn and pretzels. Fat-free sweets.  The items listed above may not be a complete list of foods and beverages you can eat. Contact a dietitian for more information.  What foods should I avoid?  Fruits  Canned fruit in a light or heavy syrup. Fried fruit. Fruit in cream or butter sauce.  Vegetables  Creamed or fried vegetables. Vegetables in a cheese sauce. Regular canned vegetables (not low-sodium or reduced-sodium). Regular canned tomato sauce and paste (not low-sodium or reduced-sodium). Regular tomato and vegetable juice (not low-sodium or reduced-sodium). Pickles. Olives.  Grains  Baked goods made with fat, such as croissants, muffins, or some breads. Dry pasta or rice meal packs.  Meats and other proteins  Fatty cuts of meat. Ribs. Fried meat. Cao. Bologna, salami, and other precooked or cured meats, such as sausages or meat loaves. Fat from the back of a pig (fatback). Bratwurst. Salted nuts and seeds. Canned beans with added salt. Canned or smoked fish.  Whole eggs or egg yolks. Chicken or turkey with skin.  Dairy  Whole or 2% milk, cream, and half-and-half. Whole or full-fat cream cheese. Whole-fat or sweetened yogurt. Full-fat cheese. Nondairy creamers. Whipped toppings. Processed cheese and cheese spreads.  Fats and oils  Butter. Stick margarine. Lard. Shortening. Ghee. Cao fat. Tropical oils, such as coconut, palm kernel, or palm oil.  Seasonings and condiments  Onion salt, garlic salt, seasoned salt, table salt, and sea salt. Worcestershire sauce. Tartar sauce. Barbecue sauce. Teriyaki sauce. Soy sauce, including reduced-sodium. Steak sauce. Canned and packaged gravies. Fish sauce. Oyster sauce. Cocktail sauce. Store-bought horseradish. Ketchup. Mustard. Meat flavorings and tenderizers. Bouillon cubes. Hot sauces. Pre-made or packaged marinades. Pre-made or packaged taco seasonings. Relishes. Regular salad dressings.  Other foods  Salted popcorn and pretzels.  The items listed above may not be a complete list of foods and beverages you should avoid. Contact a dietitian for more information.  Where to find more information  National Heart, Lung, and Blood [employer]: www.nhlbi.nih.gov  American Heart Association: www.heart.org  Academy of Nutrition and Dietetics: www.eatright.org  National Kidney Foundation: www.kidney.org  Summary  The DASH eating plan is a healthy eating plan that has been shown to reduce high blood pressure (hypertension). It may also reduce your risk for type 2 diabetes, heart disease, and stroke.  When on the DASH eating plan, aim to eat more fresh fruits and vegetables, whole grains, lean proteins, low-fat dairy, and heart-healthy fats.  With the DASH eating plan, you should limit salt (sodium) intake to 2,300 mg a day. If you have hypertension, you may need to reduce your sodium intake to 1,500 mg a day.  Work with your health care provider or dietitian to adjust your eating plan to your individual calorie needs.  This information is not  intended to replace advice given to you by your health care provider. Make sure you discuss any questions you have with your health care provider.  Document Revised: 11/20/2020 Document Reviewed: 11/20/2020  Elsevier Patient Education © 2023 [employer]

## 2023-11-27 PROBLEM — E55.9 VITAMIN D INSUFFICIENCY: Status: ACTIVE | Noted: 2023-11-27

## 2023-11-29 ENCOUNTER — HOSPITAL ENCOUNTER (OUTPATIENT)
Dept: CT IMAGING | Facility: HOSPITAL | Age: 43
Discharge: HOME | End: 2023-11-29
Admitting: NURSE PRACTITIONER
Payer: COMMERCIAL

## 2023-11-29 DIAGNOSIS — R51.9 HEADACHE, WORSENING: ICD-10-CM

## 2023-11-29 DIAGNOSIS — R07.9 CHEST PAIN, UNSPECIFIED TYPE: ICD-10-CM

## 2023-11-29 PROCEDURE — 25510000001 IOPAMIDOL 61 % SOLUTION: Performed by: NURSE PRACTITIONER

## 2023-11-29 PROCEDURE — 70470 CT HEAD/BRAIN W/O & W/DYE: CPT

## 2023-11-29 RX ADMIN — IOPAMIDOL 85 ML: 612 INJECTION, SOLUTION INTRAVENOUS at 10:05

## 2023-12-20 ENCOUNTER — OFFICE VISIT (OUTPATIENT)
Dept: INTERNAL MEDICINE | Facility: CLINIC | Age: 43
End: 2023-12-20
Payer: COMMERCIAL

## 2023-12-20 ENCOUNTER — HOSPITAL ENCOUNTER (OUTPATIENT)
Dept: CARDIOLOGY | Facility: HOSPITAL | Age: 43
Discharge: HOME | End: 2023-12-20
Admitting: NURSE PRACTITIONER
Payer: COMMERCIAL

## 2023-12-20 VITALS
DIASTOLIC BLOOD PRESSURE: 70 MMHG | WEIGHT: 208 LBS | TEMPERATURE: 97.1 F | BODY MASS INDEX: 36.86 KG/M2 | RESPIRATION RATE: 18 BRPM | HEART RATE: 76 BPM | HEIGHT: 63 IN | SYSTOLIC BLOOD PRESSURE: 112 MMHG

## 2023-12-20 VITALS
WEIGHT: 207.01 LBS | SYSTOLIC BLOOD PRESSURE: 92 MMHG | HEART RATE: 75 BPM | HEIGHT: 63 IN | OXYGEN SATURATION: 98 % | BODY MASS INDEX: 36.68 KG/M2 | DIASTOLIC BLOOD PRESSURE: 70 MMHG

## 2023-12-20 DIAGNOSIS — E78.49 OTHER HYPERLIPIDEMIA: ICD-10-CM

## 2023-12-20 DIAGNOSIS — Z00.00 ANNUAL PHYSICAL EXAM: Primary | ICD-10-CM

## 2023-12-20 DIAGNOSIS — F32.A ANXIETY AND DEPRESSION: ICD-10-CM

## 2023-12-20 DIAGNOSIS — H91.93 BILATERAL HEARING LOSS, UNSPECIFIED HEARING LOSS TYPE: ICD-10-CM

## 2023-12-20 DIAGNOSIS — E66.01 CLASS 2 SEVERE OBESITY DUE TO EXCESS CALORIES WITH SERIOUS COMORBIDITY AND BODY MASS INDEX (BMI) OF 36.0 TO 36.9 IN ADULT: ICD-10-CM

## 2023-12-20 DIAGNOSIS — F41.9 ANXIETY AND DEPRESSION: ICD-10-CM

## 2023-12-20 DIAGNOSIS — E55.9 VITAMIN D DEFICIENCY: ICD-10-CM

## 2023-12-20 DIAGNOSIS — R07.9 CHEST PAIN, UNSPECIFIED TYPE: ICD-10-CM

## 2023-12-20 LAB
BH CV STRESS BP STAGE 1: NORMAL
BH CV STRESS BP STAGE 2: NORMAL
BH CV STRESS BP STAGE 3: NORMAL
BH CV STRESS DURATION MIN STAGE 1: 3
BH CV STRESS DURATION MIN STAGE 2: 3
BH CV STRESS DURATION MIN STAGE 3: 3
BH CV STRESS DURATION SEC STAGE 1: 0
BH CV STRESS DURATION SEC STAGE 2: 0
BH CV STRESS DURATION SEC STAGE 3: 0
BH CV STRESS GRADE STAGE 1: 10
BH CV STRESS GRADE STAGE 2: 12
BH CV STRESS GRADE STAGE 3: 14
BH CV STRESS HR STAGE 1: 125
BH CV STRESS HR STAGE 2: 141
BH CV STRESS HR STAGE 3: 162
BH CV STRESS METS STAGE 1: 5
BH CV STRESS METS STAGE 2: 7.5
BH CV STRESS METS STAGE 3: 10
BH CV STRESS O2 STAGE 1: 96
BH CV STRESS O2 STAGE 2: 98
BH CV STRESS O2 STAGE 3: 98
BH CV STRESS PROTOCOL 1: NORMAL
BH CV STRESS RECOVERY BP: NORMAL MMHG
BH CV STRESS RECOVERY HR: 96 BPM
BH CV STRESS RECOVERY O2: 97 %
BH CV STRESS SPEED STAGE 1: 1.7
BH CV STRESS SPEED STAGE 2: 2.5
BH CV STRESS SPEED STAGE 3: 3.4
BH CV STRESS STAGE 1: 1
BH CV STRESS STAGE 2: 2
BH CV STRESS STAGE 3: 3
MAXIMAL PREDICTED HEART RATE: 179 BPM
PERCENT MAX PREDICTED HR: 91.62 %
STRESS BASELINE BP: NORMAL MMHG
STRESS BASELINE HR: 80 BPM
STRESS O2 SAT REST: 98 %
STRESS PERCENT HR: 108 %
STRESS POST ESTIMATED WORKLOAD: 10.4 METS
STRESS POST EXERCISE DUR MIN: 9 MIN
STRESS POST EXERCISE DUR SEC: 0 SEC
STRESS POST O2 SAT PEAK: 98 %
STRESS POST PEAK BP: NORMAL MMHG
STRESS POST PEAK HR: 164 BPM
STRESS TARGET HR: 152 BPM

## 2023-12-20 PROCEDURE — 93017 CV STRESS TEST TRACING ONLY: CPT

## 2023-12-20 NOTE — PROGRESS NOTES
"Chief Complaint  Annual Exam    Subjective          Denise Lambert presents to [facility]  History of Present Illness  The patient presents today via video visit for a physical.    Vitamin D deficiency  Her vitamin D is insufficient. She is currently taking over-the-counter vitamin D 2000 units daily.    Mental health  She is currently taking Vyvanse, lamotrigine, and buspirone.    Hypertension  Her blood pressure is within normal limits today. She is currently taking triamterene-hydrochlorothiazide.    Hyperlipidemia  Her cholesterol was elevated significantly enough that she would need cholesterol medication.    Menstrual cycle  She is still having her menstrual cycle. She is due for a Pap smear. She sees a gynecologist.    Hearing loss  She would like to have her hearing checked.    Health maintenance  She has seen her eye doctor and dentist this year. She is not following a specific diet.      Her labs were completed 11/22/2023 noting an elevated cholesterol encourage patient to start statin had not heard back from her also had vitamin D insufficiency encouraged over-the-counter vitamin D 2000 units daily will recheck labs in 3 months. declines flu shot       Current Outpatient Medications:     busPIRone (BUSPAR) 2.5 MG half tablet, Take 2 half tablet by mouth As Needed., Disp: , Rfl:     lamoTRIgine (LaMICtal) 150 MG tablet, Take 1 tablet by mouth Daily., Disp: , Rfl:     VYVANSE 60 MG capsule, , Disp: , Rfl:          Objective   Vital Signs:   /70 (BP Location: Right arm, Patient Position: Sitting, Cuff Size: Large Adult)   Pulse 76   Temp 97.1 °F (36.2 °C) (Infrared)   Resp 18   Ht 160 cm (62.99\")   Wt 94.3 kg (208 lb)   BMI 36.86 kg/m²     Physical Exam  Vitals and nursing note reviewed.   Constitutional:       General: She is not in acute distress.     Appearance: Normal appearance. She is well-developed. She is not ill-appearing.   HENT:     "  Head: Normocephalic and atraumatic.      Right Ear: Tympanic membrane and external ear normal.      Left Ear: Tympanic membrane and external ear normal.      Nose: Nose normal.      Mouth/Throat:      Mouth: Mucous membranes are moist.      Pharynx: Oropharynx is clear. No oropharyngeal exudate or posterior oropharyngeal erythema.   Eyes:      General: No scleral icterus.        Right eye: No discharge.         Left eye: No discharge.      Conjunctiva/sclera: Conjunctivae normal.      Pupils: Pupils are equal, round, and reactive to light.   Neck:      Thyroid: No thyromegaly.      Vascular: No carotid bruit.   Cardiovascular:      Rate and Rhythm: Normal rate and regular rhythm.      Heart sounds: Normal heart sounds. No murmur heard.     No friction rub. No gallop.   Pulmonary:      Effort: Pulmonary effort is normal.      Breath sounds: Normal breath sounds.   Chest:   Breasts:     Right: Normal.      Left: Normal.   Abdominal:      General: Bowel sounds are normal. There is no distension.      Palpations: Abdomen is soft. There is no mass.      Tenderness: There is no abdominal tenderness. There is no guarding or rebound.      Hernia: No hernia is present.   Musculoskeletal:         General: Normal range of motion.      Cervical back: Normal range of motion and neck supple.   Lymphadenopathy:      Head:      Right side of head: No submental, submandibular, tonsillar, preauricular, posterior auricular or occipital adenopathy.      Left side of head: No submental, submandibular, tonsillar, preauricular, posterior auricular or occipital adenopathy.      Cervical: No cervical adenopathy.      Right cervical: No superficial, deep or posterior cervical adenopathy.     Left cervical: No superficial, deep or posterior cervical adenopathy.      Upper Body:      Right upper body: No supraclavicular, axillary, pectoral or epitrochlear adenopathy.      Left upper body: No supraclavicular, axillary, pectoral or epitrochlear  adenopathy.      Lower Body: No right inguinal adenopathy. No left inguinal adenopathy.   Skin:     General: Skin is warm and dry.      Capillary Refill: Capillary refill takes 2 to 3 seconds.   Neurological:      Mental Status: She is alert and oriented to person, place, and time.      Deep Tendon Reflexes: Reflexes normal.   Psychiatric:         Behavior: Behavior normal.         Thought Content: Thought content normal.         Judgment: Judgment normal.        Result Review :                 Assessment and Plan    Diagnoses and all orders for this visit:    1. Annual physical exam (Primary)    2. Vitamin D deficiency    3. Other hyperlipidemia    4. Class 2 severe obesity due to excess calories with serious comorbidity and body mass index (BMI) of 36.0 to 36.9 in adult    5. Anxiety and depression    6. Bilateral hearing loss, unspecified hearing loss type  -     Ambulatory Referral to Audiology    Annual physical exam (Primary)  - She is current on her preventative care.    Vitamin D deficiency  - She will continue her weekly supplementation.     Hyperlipidemia, unspecified hyperlipidemia type  - She will have a lipid panel completed in 3 months and will consider starting on medication if it is elevated.     Hearing loss, unspecified hearing loss type, unspecified laterality  - Referral to Audiology      Class 2 Severe Obesity (BMI >=35 and <=39.9). Obesity-related health conditions include the following: none. Obesity is unchanged. BMI is is above average; BMI management plan is completed. We discussed portion control and increasing exercise.        Patient education regarding the importance of avoidance of texting while driving and the need for wearing seatbelt.  Use of sunscreen, use of helmets while on bikes.  The appropriate amounts of sleep and H20 consumption per day was reviewed with pt.  The need for healthy well-balanced diet based off the food guide pyramid and avoidance of skipping meals along with  "following a NCS diet with appropriate amounts of fats, protein, and carbohydrate and low sodium diet. Encouraging 30minutes of cardio 5d weekly and muscle strengthening 3x weekly.       A1C: No results found for: \"HGBA1C\"   ACP: Advance Care Planning   ACP discussion was held with the patient during this visit. Patient does not have an advance directive, information provided.     Follow Up   Return in about 3 months (around 3/20/2024) for Next scheduled follow up, fasting.  Patient was given instructions and counseling regarding her condition or for health maintenance advice. Please see specific information pulled into the AVS if appropriate.     RTC/call  If symptoms worsen  Meds MOA and SE's reviewed and pt v/u    RACHELL Boles  [facility]  [address] [place] [phone]  Fax [phone]  Phone     Transcribed from ambient dictation for RACHELL Boles by Nico Godinez.  12/20/23   13:50 EST    Patient or patient representative verbalized consent to the visit recording.  I have personally performed the services described in this document as transcribed by the above individual, and it is both accurate and complete.    "

## 2023-12-20 NOTE — PATIENT INSTRUCTIONS
MyPlate from USDA    MyPlate is an outline of a general healthy diet based on the Dietary Guidelines for Americans, 1673-2300, from the U.S. Department of Agriculture (USDA). It sets guidelines for how much food you should eat from each food group based on your age, sex, and level of physical activity.  What are tips for following MyPlate?  To follow MyPlate recommendations:  Eat a wide variety of fruits and vegetables, grains, and protein foods.  Serve smaller portions and eat less food throughout the day.  Limit portion sizes to avoid overeating.  Enjoy your food.  Get at least 150 minutes of exercise every week. This is about 30 minutes each day, 5 or more days per week.  It can be difficult to have every meal look like MyPlate. Think about MyPlate as eating guidelines for an entire day, rather than each individual meal.  Fruits and vegetables  Make one half of your plate fruits and vegetables.  Eat many different colors of fruits and vegetables each day.  For a 2,000-calorie daily food plan, eat:  2½ cups of vegetables every day.  2 cups of fruit every day.  1 cup is equal to:  1 cup raw or cooked vegetables.  1 cup raw fruit.  1 medium-sized orange, apple, or banana.  1 cup 100% fruit or vegetable juice.  2 cups raw leafy greens, such as lettuce, spinach, or kale.  ½ cup dried fruit.  Grains  One fourth of your plate should be grains.  Make at least half of the grains you eat each day whole grains.  For a 2,000-calorie daily food plan, eat 6 oz of grains every day.  1 oz is equal to:  1 slice bread.  1 cup cereal.  ½ cup cooked rice, cereal, or pasta.  Protein  One fourth of your plate should be protein.  Eat a wide variety of protein foods, including meat, poultry, fish, eggs, beans, nuts, and tofu.  For a 2,000-calorie daily food plan, eat 5½ oz of protein every day.  1 oz is equal to:  1 oz meat, poultry, or fish.  ¼ cup cooked beans.  1 egg.  ½ oz nuts or seeds.  1 Tbsp peanut butter.  Dairy  Drink fat-free  or low-fat (1%) milk.  Eat or drink dairy as a side to meals.  For a 2,000-calorie daily food plan, eat or drink 3 cups of dairy every day.  1 cup is equal to:  1 cup milk, yogurt, cottage cheese, or soy milk (soy beverage).  2 oz processed cheese.  1½ oz natural cheese.  Fats, oils, salt, and sugars  Only small amounts of oils are recommended.  Avoid foods that are high in calories and low in nutritional value (empty calories), like foods high in fat or added sugars.  Choose foods that are low in salt (sodium). Choose foods that have less than 140 milligrams (mg) of sodium per serving.  Drink water instead of sugary drinks. Drink enough fluid to keep your urine pale yellow.  Where to find support  Work with your health care provider or a dietitian to develop a customized eating plan that is right for you.  Download an suman (mobile application) to help you track your daily food intake.  Where to find more information  USDA: ChooseMyPlate.gov  Summary  MyPlate is a general guideline for healthy eating from the USDA. It is based on the Dietary Guidelines for Americans, 9433-6563.  In general, fruits and vegetables should take up one half of your plate, grains should take up one fourth of your plate, and protein should take up one fourth of your plate.  This information is not intended to replace advice given to you by your health care provider. Make sure you discuss any questions you have with your health care provider.  Document Revised: 11/08/2021 Document Reviewed: 11/08/2021  [employer] Patient Education © 2023 [employer]    Advance Care Planning and Advance Directives     You make decisions on a daily basis - decisions about where you want to live, your career, your home, your life. Perhaps one of the most important decisions you face is your choice for future medical care. Take time to talk with your family and your healthcare team and start planning today.  Advance Care Planning is a process that can help  you:  Understand possible future healthcare decisions in light of your own experiences  Reflect on those decision in light of your goals and values  Discuss your decisions with those closest to you and the healthcare professionals that care for you  Make a plan by creating a document that reflects your wishes    Surrogate Decision Maker  In the event of a medical emergency, which has left you unable to communicate or to make your own decisions, you would need someone to make decisions for you.  It is important to discuss your preferences for medical treatment with this person while you are in good health.     Qualities of a surrogate decision maker:  Willing to take on this role and responsibility  Knows what you want for future medical care  Willing to follow your wishes even if they don't agree with them  Able to make difficult medical decisions under stressful circumstances    Advance Directives  These are legal documents you can create that will guide your healthcare team and decision maker(s) when needed. These documents can be stored in the electronic medical record.    Living Will - a legal document to guide your care if you have a terminal condition or a serious illness and are unable to communicate. States vary by statute in document names/types, but most forms may include one or more of the following:        -  Directions regarding life-prolonging treatments        -  Directions regarding artificially provided nutrition/hydration        -  Choosing a healthcare decision maker        -  Direction regarding organ/tissue donation    Durable Power of  for Healthcare - this document names an -in-fact to make medical decisions for you, but it may also allow this person to make personal and financial decisions for you. Please seek the advice of an  if you need this type of document.    **Advance Directives are not required and no one may discriminate against you if you do not sign  one.    Medical Orders  Many states allow specific forms/orders signed by your physician to record your wishes for medical treatment in your current state of health. This form, signed in personal communication with your physician, addresses resuscitation and other medical interventions that you may or may not want.      For more information or to schedule a time with a [facility] Advance Care Planning Facilitator contact: [facility]/ACP or call [phone] and someone will contact you directly.

## 2024-01-03 RX ORDER — TRIAMTERENE AND HYDROCHLOROTHIAZIDE 37.5; 25 MG/1; MG/1
1 CAPSULE ORAL EVERY MORNING
Qty: 30 CAPSULE | Refills: 0 | OUTPATIENT
Start: 2024-01-03

## 2024-10-24 ENCOUNTER — TRANSCRIBE ORDERS (OUTPATIENT)
Dept: ADMINISTRATIVE | Facility: HOSPITAL | Age: 44
End: 2024-10-24
Payer: COMMERCIAL

## 2024-10-24 DIAGNOSIS — Z12.31 SCREENING MAMMOGRAM FOR BREAST CANCER: Primary | ICD-10-CM

## 2025-02-19 LAB
NCCN CRITERIA FLAG: ABNORMAL
TYRER CUZICK SCORE: 6.6

## 2025-03-11 ENCOUNTER — RESULTS FOLLOW-UP (OUTPATIENT)
Facility: HOSPITAL | Age: 45
End: 2025-03-11
Payer: COMMERCIAL

## 2025-03-11 NOTE — PROGRESS NOTES
This patient recently completed the CARE risk assessment for a mammogram appointment. Based on the patient's responses, NCCN criteria for genetic testing was met.     Navigator follow-up:   I left a voice mail and a [employer] message was sent previously requesting a return call to discuss risk assessment results.

## 2025-03-24 ENCOUNTER — HOSPITAL ENCOUNTER (OUTPATIENT)
Dept: MAMMOGRAPHY | Facility: HOSPITAL | Age: 45
Discharge: HOME | End: 2025-03-24
Admitting: NURSE PRACTITIONER
Payer: COMMERCIAL

## 2025-03-24 DIAGNOSIS — Z12.31 SCREENING MAMMOGRAM FOR BREAST CANCER: ICD-10-CM

## 2025-03-24 PROCEDURE — 77067 SCR MAMMO BI INCL CAD: CPT

## 2025-03-24 PROCEDURE — 77063 BREAST TOMOSYNTHESIS BI: CPT

## 2025-07-25 ENCOUNTER — OFFICE VISIT (OUTPATIENT)
Dept: INTERNAL MEDICINE | Facility: CLINIC | Age: 45
End: 2025-07-25
Payer: COMMERCIAL

## 2025-07-25 VITALS
DIASTOLIC BLOOD PRESSURE: 72 MMHG | OXYGEN SATURATION: 99 % | RESPIRATION RATE: 16 BRPM | TEMPERATURE: 96.8 F | SYSTOLIC BLOOD PRESSURE: 114 MMHG | WEIGHT: 186.6 LBS | HEIGHT: 63 IN | BODY MASS INDEX: 33.06 KG/M2 | HEART RATE: 98 BPM

## 2025-07-25 DIAGNOSIS — Z00.00 ANNUAL PHYSICAL EXAM: Primary | ICD-10-CM

## 2025-07-25 DIAGNOSIS — F32.A ANXIETY AND DEPRESSION: ICD-10-CM

## 2025-07-25 DIAGNOSIS — F41.9 ANXIETY AND DEPRESSION: ICD-10-CM

## 2025-07-25 DIAGNOSIS — H91.90 HOH (HARD OF HEARING): ICD-10-CM

## 2025-07-25 DIAGNOSIS — Z13.1 ENCOUNTER FOR SCREENING EXAMINATION FOR IMPAIRED GLUCOSE REGULATION AND DIABETES MELLITUS: ICD-10-CM

## 2025-07-25 DIAGNOSIS — Z13.29 THYROID DISORDER SCREEN: ICD-10-CM

## 2025-07-25 DIAGNOSIS — E78.2 MIXED HYPERLIPIDEMIA: ICD-10-CM

## 2025-07-25 DIAGNOSIS — E55.9 VITAMIN D DEFICIENCY: ICD-10-CM

## 2025-07-25 DIAGNOSIS — Z13.220 LIPID SCREENING: ICD-10-CM

## 2025-07-25 LAB
BILIRUB BLD-MCNC: NEGATIVE MG/DL
CLARITY, POC: CLEAR
COLOR UR: YELLOW
EXPIRATION DATE: NORMAL
GLUCOSE UR STRIP-MCNC: NEGATIVE MG/DL
KETONES UR QL: NEGATIVE
LEUKOCYTE EST, POC: NEGATIVE
Lab: NORMAL
NITRITE UR-MCNC: NEGATIVE MG/ML
PH UR: 5 [PH] (ref 5–8)
PROT UR STRIP-MCNC: NEGATIVE MG/DL
RBC # UR STRIP: NEGATIVE /UL
SP GR UR: 1 (ref 1–1.03)
UROBILINOGEN UR QL: NORMAL

## 2025-07-25 PROCEDURE — 80050 GENERAL HEALTH PANEL: CPT | Performed by: NURSE PRACTITIONER

## 2025-07-25 PROCEDURE — 82306 VITAMIN D 25 HYDROXY: CPT | Performed by: NURSE PRACTITIONER

## 2025-07-25 PROCEDURE — 80061 LIPID PANEL: CPT | Performed by: NURSE PRACTITIONER

## 2025-07-25 NOTE — PROGRESS NOTES
"Chief Complaint  Annual Exam    Subjective          Denise Lambert presents to [facility]  History of Present Illness  History of Present Illness  The patient presents for a yearly physical.    She has been experiencing some hearing difficulties, particularly when her  speaks from a distance. Additionally, she has noticed an increased difficulty in hearing through her stethoscope.    She has lost 30 pounds since her last visit. She recently started taking tirzepatide, a GLP-1 medication, but notes that the weight loss was achieved prior to starting this medication. She is on a compounded version of tirzepatide.    She continues to take Vyvanse and buspirone as part of her behavioral health management.    Her last Pap smear was conducted on 05/07/2024, and she plans to schedule her next appointment soon. A breast exam was performed by her gynecologist earlier this year.    Objective   Vital Signs:   /72 (BP Location: Right arm, Patient Position: Sitting, Cuff Size: Adult)   Pulse 98   Temp 96.8 °F (36 °C) (Infrared)   Resp 16   Ht 160 cm (62.99\")   Wt 84.6 kg (186 lb 9.6 oz)   SpO2 99%   BMI 33.06 kg/m²     Physical Exam  Vitals and nursing note reviewed.   Constitutional:       General: She is not in acute distress.     Appearance: Normal appearance. She is well-developed. She is not ill-appearing.   HENT:      Head: Normocephalic and atraumatic.      Right Ear: Tympanic membrane and external ear normal.      Left Ear: Tympanic membrane and external ear normal.      Nose: Nose normal.      Mouth/Throat:      Mouth: Mucous membranes are moist.      Pharynx: Oropharynx is clear. No oropharyngeal exudate or posterior oropharyngeal erythema.   Eyes:      General: No scleral icterus.        Right eye: No discharge.         Left eye: No discharge.      Conjunctiva/sclera: Conjunctivae normal.      Pupils: Pupils are equal, round, and reactive to " light.   Neck:      Thyroid: No thyromegaly.      Vascular: No carotid bruit.   Cardiovascular:      Rate and Rhythm: Normal rate and regular rhythm.      Heart sounds: Normal heart sounds. No murmur heard.     No friction rub. No gallop.   Pulmonary:      Effort: Pulmonary effort is normal.      Breath sounds: Normal breath sounds.   Chest:   Breasts:     Right: Normal.      Left: Normal.   Abdominal:      General: Bowel sounds are normal. There is no distension.      Palpations: Abdomen is soft. There is no mass.      Tenderness: There is no abdominal tenderness. There is no guarding or rebound.      Hernia: No hernia is present.   Musculoskeletal:         General: Normal range of motion.      Cervical back: Normal range of motion and neck supple.   Lymphadenopathy:      Head:      Right side of head: No submental, submandibular, tonsillar, preauricular, posterior auricular or occipital adenopathy.      Left side of head: No submental, submandibular, tonsillar, preauricular, posterior auricular or occipital adenopathy.      Cervical: No cervical adenopathy.      Right cervical: No superficial, deep or posterior cervical adenopathy.     Left cervical: No superficial, deep or posterior cervical adenopathy.      Upper Body:      Right upper body: No supraclavicular, axillary, pectoral or epitrochlear adenopathy.      Left upper body: No supraclavicular, axillary, pectoral or epitrochlear adenopathy.      Lower Body: No right inguinal adenopathy. No left inguinal adenopathy.   Skin:     General: Skin is warm and dry.      Capillary Refill: Capillary refill takes 2 to 3 seconds.   Neurological:      Mental Status: She is alert and oriented to person, place, and time.      Deep Tendon Reflexes: Reflexes normal.   Psychiatric:         Behavior: Behavior normal.         Thought Content: Thought content normal.         Judgment: Judgment normal.        Result Review :                 Assessment and Plan    Diagnoses and  all orders for this visit:    1. Annual physical exam (Primary)  -     Ambulatory Referral to Audiology  -     CBC & Differential; Future    2. [place] (hard of hearing)  -     Ambulatory Referral to Audiology  -     CBC & Differential; Future    3. Thyroid disorder screen  -     CBC & Differential; Future  -     TSH; Future    4. Lipid screening  -     CBC & Differential; Future  -     Lipid Panel; Future    5. Encounter for screening examination for impaired glucose regulation and diabetes mellitus  -     CBC & Differential; Future  -     Comprehensive Metabolic Panel; Future  -     POC Urinalysis Dipstick, Automated; Future    6. Vitamin D deficiency  -     Vitamin D,25-Hydroxy; Future    7. Anxiety and depression    8. Mixed hyperlipidemia      Assessment & Plan  1. Hearing impairment.  - Reports difficulty hearing, especially when her  speaks from a distance and while using her stethoscope.  - Referral for a hearing test will be arranged.    2. Weight management.  - Lost 30 pounds since last year and recently started tirzepatide.  - Advised to ensure daily bowel movements to prevent constipation and potential bowel blockage.  - Recommended diet including 100 g of protein, muscle strengthening exercises, and a healthy diet. Multivitamin and vitamin D supplements suggested.  - Regular neck checks for lumps or bumps advised due to the risk of thyroid cancer associated with tirzepatide.    3. Behavioral health management.  - Currently on Vyvanse and buspirone with no changes in behavioral health needs.  - Will continue current medications.    4. Health maintenance.  - Mammogram due in 03/2026 unless an earlier appointment is necessary.  - Advised to receive COVID-19 vaccine at pharmacy.  - Fasting cholesterol and other labs will be checked today.  - Due for a Pap smear and will schedule an appointment.  - Encouraged to maintain good oral hygiene, use sunscreen, wear seatbelts and sunglasses, and perform regular  "breast self-exams. Provided a comprehensive list of symptoms to monitor for, including headaches, fainting, chest pain, shortness of breath, abdominal pain, blood in urine or stool, changes in menstrual cycle, new lumps or bumps, rashes, and signs of skin cancer.            BMI is >= 30 and <35. (Class 1 Obesity). The following options were offered after discussion;: weight loss educational material (shared in after visit summary) and exercise counseling/recommendations          Depression: PHQ-2/9 Depression Screening  Little interest or pleasure in doing things?  0   Feeling down, depressed, or hopeless?  0   PHQ-2 Total Score  0   PHQ-9 Total Score          AWV: na  A1C: No results found for: \"HGBA1C\"   ACP: Advance Care Planning   ACP discussion was held with the patient during this visit. Patient has an advance directive in EMR which is still valid.  Patient does not have an advance directive, information provided.     Follow Up   Return in about 1 year (around 7/25/2026) for fasting, Annual.  Patient was given instructions and counseling regarding her condition or for health maintenance advice. Please see specific information pulled into the AVS if appropriate.     RTC/call  If symptoms worsen  Meds MOA and SE's reviewed and pt v/u    07/25/25   13:55 EDT    Patient or patient representative verbalized consent to the visit recording.  I have personally performed the services described in this document as transcribed by the above individual, and it is both accurate and complete.  "

## 2025-07-25 NOTE — PATIENT INSTRUCTIONS
MyPlate from USDA    MyPlate is an outline of a general healthy diet based on the Dietary Guidelines for Americans, 9546-6930, from the [place] Department of Agriculture (USDA). It sets guidelines for how much food you should eat from each food group based on your age, sex, and level of physical activity.  What are tips for following MyPlate?  To follow MyPlate recommendations:  Eat a wide variety of fruits and vegetables, grains, and protein foods.  Serve smaller portions and eat less food throughout the day.  Limit portion sizes to avoid overeating.  Enjoy your food.  Get at least 150 minutes of exercise every week. This is about 30 minutes each day, 5 or more days per week.  It can be difficult to have every meal look like MyPlate. Think about MyPlate as eating guidelines for an entire day, rather than each individual meal.  Fruits and vegetables  Make one half of your plate fruits and vegetables.  Eat many different colors of fruits and vegetables each day.  For a 2,000-calorie daily food plan, eat:  2½ cups of vegetables every day.  2 cups of fruit every day.  1 cup is equal to:  1 cup raw or cooked vegetables.  1 cup raw fruit.  1 medium-sized orange, apple, or banana.  1 cup 100% fruit or vegetable juice.  2 cups raw leafy greens, such as lettuce, spinach, or kale.  ½ cup dried fruit.  Grains  One fourth of your plate should be grains.  Make at least half of the grains you eat each day whole grains.  For a 2,000-calorie daily food plan, eat 6 oz of grains every day.  1 oz is equal to:  1 slice bread.  1 cup cereal.  ½ cup cooked rice, cereal, or pasta.  Protein  One fourth of your plate should be protein.  Eat a wide variety of protein foods, including meat, poultry, fish, eggs, beans, nuts, and tofu.  For a 2,000-calorie daily food plan, eat 5½ oz of protein every day.  1 oz is equal to:  1 oz meat, poultry, or fish.  ¼ cup cooked beans.  1 egg.  ½ oz nuts or seeds.  1 Tbsp peanut butter.  Dairy  Drink fat-free  or low-fat (1%) milk.  Eat or drink dairy as a side to meals.  For a 2,000-calorie daily food plan, eat or drink 3 cups of dairy every day.  1 cup is equal to:  1 cup milk, yogurt, cottage cheese, or soy milk (soy beverage).  2 oz processed cheese.  1½ oz natural cheese.  Fats, oils, salt, and sugars  Only small amounts of oils are recommended.  Avoid foods that are high in calories and low in nutritional value (empty calories), like foods high in fat or added sugars.  Choose foods that are low in salt (sodium). Choose foods that have less than 140 milligrams (mg) of sodium per serving.  Drink water instead of sugary drinks. Drink enough fluid to keep your urine pale yellow.  Where to find support  Work with your health care provider or a dietitian to develop a customized eating plan that is right for you.  Download an suman (mobile application) to help you track your daily food intake.  Where to find more information  USDA: ChooseMyPlate.gov  Summary  MyPlate is a general guideline for healthy eating from the USDA. It is based on the Dietary Guidelines for Americans, 1636-0961.  In general, fruits and vegetables should take up one half of your plate, grains should take up one fourth of your plate, and protein should take up one fourth of your plate.  This information is not intended to replace advice given to you by your health care provider. Make sure you discuss any questions you have with your health care provider.  Document Revised: 11/08/2021 Document Reviewed: 11/08/2021  [employer] Patient Education © 2024 [employer]  Advance Care Planning and Advance Directives     You make decisions on a daily basis - decisions about where you want to live, your career, your home, your life. Perhaps one of the most important decisions you face is your choice for future medical care. Take time to talk with your family and your healthcare team and start planning today.  Advance Care Planning is a process that can help  you:  Understand possible future healthcare decisions in light of your own experiences  Reflect on those decision in light of your goals and values  Discuss your decisions with those closest to you and the healthcare professionals that care for you  Make a plan by creating a document that reflects your wishes    Surrogate Decision Maker  In the event of a medical emergency, which has left you unable to communicate or to make your own decisions, you would need someone to make decisions for you.  It is important to discuss your preferences for medical treatment with this person while you are in good health.     Qualities of a surrogate decision maker:  Willing to take on this role and responsibility  Knows what you want for future medical care  Willing to follow your wishes even if they don't agree with them  Able to make difficult medical decisions under stressful circumstances    Advance Directives  These are legal documents you can create that will guide your healthcare team and decision maker(s) when needed. These documents can be stored in the electronic medical record.    Living Will - a legal document to guide your care if you have a terminal condition or a serious illness and are unable to communicate. States vary by statute in document names/types, but most forms may include one or more of the following:        -  Directions regarding life-prolonging treatments        -  Directions regarding artificially provided nutrition/hydration        -  Choosing a healthcare decision maker        -  Direction regarding organ/tissue donation    Durable Power of  for Healthcare - this document names an -in-fact to make medical decisions for you, but it may also allow this person to make personal and financial decisions for you. Please seek the advice of an  if you need this type of document.    **Advance Directives are not required and no one may discriminate against you if you do not sign  one.    Medical Orders  Many states allow specific forms/orders signed by your physician to record your wishes for medical treatment in your current state of health. This form, signed in personal communication with your physician, addresses resuscitation and other medical interventions that you may or may not want.      For more information or to schedule a time with a [facility] Advance Care Planning Facilitator contact: [facility]/ACP or call [phone] and someone will contact you directly.ACP information pamphlet given to the patient.  ACP information provided on the AVS.

## 2025-07-26 ENCOUNTER — RESULTS FOLLOW-UP (OUTPATIENT)
Dept: INTERNAL MEDICINE | Facility: CLINIC | Age: 45
End: 2025-07-26
Payer: COMMERCIAL

## 2025-07-26 LAB
25(OH)D3 SERPL-MCNC: 20.5 NG/ML (ref 30–100)
ALBUMIN SERPL-MCNC: 4.5 G/DL (ref 3.5–5.2)
ALBUMIN/GLOB SERPL: 1.5 G/DL
ALP SERPL-CCNC: 46 U/L (ref 39–117)
ALT SERPL W P-5'-P-CCNC: 11 U/L (ref 1–33)
ANION GAP SERPL CALCULATED.3IONS-SCNC: 11.7 MMOL/L (ref 5–15)
AST SERPL-CCNC: 16 U/L (ref 1–32)
BASOPHILS # BLD AUTO: 0.02 10*3/MM3 (ref 0–0.2)
BASOPHILS NFR BLD AUTO: 0.3 % (ref 0–1.5)
BILIRUB SERPL-MCNC: 0.9 MG/DL (ref 0–1.2)
BUN SERPL-MCNC: 8 MG/DL (ref 6–20)
BUN/CREAT SERPL: 12.3 (ref 7–25)
CALCIUM SPEC-SCNC: 9.4 MG/DL (ref 8.6–10.5)
CHLORIDE SERPL-SCNC: 103 MMOL/L (ref 98–107)
CHOLEST SERPL-MCNC: 263 MG/DL (ref 0–200)
CO2 SERPL-SCNC: 22.3 MMOL/L (ref 22–29)
CREAT SERPL-MCNC: 0.65 MG/DL (ref 0.57–1)
DEPRECATED RDW RBC AUTO: 39.3 FL (ref 37–54)
EGFRCR SERPLBLD CKD-EPI 2021: 112.2 ML/MIN/1.73
EOSINOPHIL # BLD AUTO: 0.05 10*3/MM3 (ref 0–0.4)
EOSINOPHIL NFR BLD AUTO: 0.7 % (ref 0.3–6.2)
ERYTHROCYTE [DISTWIDTH] IN BLOOD BY AUTOMATED COUNT: 12.8 % (ref 12.3–15.4)
GLOBULIN UR ELPH-MCNC: 3 GM/DL
GLUCOSE SERPL-MCNC: 75 MG/DL (ref 65–99)
HCT VFR BLD AUTO: 42.8 % (ref 34–46.6)
HDLC SERPL-MCNC: 53 MG/DL (ref 40–60)
HGB BLD-MCNC: 14.4 G/DL (ref 12–15.9)
IMM GRANULOCYTES # BLD AUTO: 0.01 10*3/MM3 (ref 0–0.05)
IMM GRANULOCYTES NFR BLD AUTO: 0.1 % (ref 0–0.5)
LDLC SERPL CALC-MCNC: 197 MG/DL (ref 0–100)
LDLC/HDLC SERPL: 3.67 {RATIO}
LYMPHOCYTES # BLD AUTO: 2.21 10*3/MM3 (ref 0.7–3.1)
LYMPHOCYTES NFR BLD AUTO: 30.7 % (ref 19.6–45.3)
MCH RBC QN AUTO: 28.9 PG (ref 26.6–33)
MCHC RBC AUTO-ENTMCNC: 33.6 G/DL (ref 31.5–35.7)
MCV RBC AUTO: 85.8 FL (ref 79–97)
MONOCYTES # BLD AUTO: 0.37 10*3/MM3 (ref 0.1–0.9)
MONOCYTES NFR BLD AUTO: 5.1 % (ref 5–12)
NEUTROPHILS NFR BLD AUTO: 4.54 10*3/MM3 (ref 1.7–7)
NEUTROPHILS NFR BLD AUTO: 63.1 % (ref 42.7–76)
NRBC BLD AUTO-RTO: 0.4 /100 WBC (ref 0–0.2)
PLATELET # BLD AUTO: 301 10*3/MM3 (ref 140–450)
PMV BLD AUTO: 8.9 FL (ref 6–12)
POTASSIUM SERPL-SCNC: 4 MMOL/L (ref 3.5–5.2)
PROT SERPL-MCNC: 7.5 G/DL (ref 6–8.5)
RBC # BLD AUTO: 4.99 10*6/MM3 (ref 3.77–5.28)
SODIUM SERPL-SCNC: 137 MMOL/L (ref 136–145)
TRIGL SERPL-MCNC: 78 MG/DL (ref 0–150)
TSH SERPL DL<=0.05 MIU/L-ACNC: 2.25 UIU/ML (ref 0.27–4.2)
VLDLC SERPL-MCNC: 13 MG/DL (ref 5–40)
WBC NRBC COR # BLD AUTO: 7.2 10*3/MM3 (ref 3.4–10.8)